# Patient Record
Sex: FEMALE | Race: OTHER | HISPANIC OR LATINO | URBAN - METROPOLITAN AREA
[De-identification: names, ages, dates, MRNs, and addresses within clinical notes are randomized per-mention and may not be internally consistent; named-entity substitution may affect disease eponyms.]

---

## 2019-06-30 ENCOUNTER — EMERGENCY (EMERGENCY)
Facility: HOSPITAL | Age: 19
LOS: 1 days | Discharge: HOME | End: 2019-06-30
Attending: EMERGENCY MEDICINE
Payer: SELF-PAY

## 2019-06-30 VITALS
SYSTOLIC BLOOD PRESSURE: 106 MMHG | OXYGEN SATURATION: 98 % | HEIGHT: 62 IN | DIASTOLIC BLOOD PRESSURE: 69 MMHG | TEMPERATURE: 99 F | WEIGHT: 130.07 LBS | RESPIRATION RATE: 16 BRPM | HEART RATE: 108 BPM

## 2019-06-30 VITALS — HEART RATE: 92 BPM

## 2019-06-30 LAB
APPEARANCE UR: CLEAR — SIGNIFICANT CHANGE UP
BILIRUB UR-MCNC: NEGATIVE — SIGNIFICANT CHANGE UP
COLOR SPEC: YELLOW — SIGNIFICANT CHANGE UP
DIFF PNL FLD: ABNORMAL
GLUCOSE UR QL: NEGATIVE MG/DL — SIGNIFICANT CHANGE UP
HCG UR QL: NEGATIVE — SIGNIFICANT CHANGE UP
KETONES UR-MCNC: NEGATIVE — SIGNIFICANT CHANGE UP
LEUKOCYTE ESTERASE UR-ACNC: ABNORMAL
NITRITE UR-MCNC: NEGATIVE — SIGNIFICANT CHANGE UP
PH UR: 6.5 — SIGNIFICANT CHANGE UP (ref 5–8)
PROT UR-MCNC: NEGATIVE MG/DL — SIGNIFICANT CHANGE UP
SP GR SPEC: 1 — LOW (ref 1.01–1.02)
UROBILINOGEN FLD QL: NEGATIVE MG/DL — SIGNIFICANT CHANGE UP

## 2019-06-30 PROCEDURE — 81001 URINALYSIS AUTO W/SCOPE: CPT

## 2019-06-30 PROCEDURE — 87591 N.GONORRHOEAE DNA AMP PROB: CPT

## 2019-06-30 PROCEDURE — 81025 URINE PREGNANCY TEST: CPT

## 2019-06-30 PROCEDURE — 99283 EMERGENCY DEPT VISIT LOW MDM: CPT

## 2019-06-30 PROCEDURE — 87491 CHLMYD TRACH DNA AMP PROBE: CPT

## 2019-06-30 PROCEDURE — T1013: CPT

## 2019-06-30 RX ORDER — PHENAZOPYRIDINE HCL 100 MG
1 TABLET ORAL
Qty: 5 | Refills: 0
Start: 2019-06-30 | End: 2019-07-01

## 2019-06-30 RX ORDER — CEPHALEXIN 500 MG
500 CAPSULE ORAL ONCE
Refills: 0 | Status: COMPLETED | OUTPATIENT
Start: 2019-06-30 | End: 2019-06-30

## 2019-06-30 RX ORDER — CEPHALEXIN 500 MG
1 CAPSULE ORAL
Qty: 14 | Refills: 0
Start: 2019-06-30 | End: 2019-07-06

## 2019-06-30 RX ORDER — PHENAZOPYRIDINE HCL 100 MG
200 TABLET ORAL ONCE
Refills: 0 | Status: COMPLETED | OUTPATIENT
Start: 2019-06-30 | End: 2019-06-30

## 2019-06-30 RX ADMIN — Medication 200 MILLIGRAM(S): at 13:38

## 2019-06-30 RX ADMIN — Medication 500 MILLIGRAM(S): at 13:38

## 2019-06-30 NOTE — ED PROVIDER NOTE - ATTENDING CONTRIBUTION TO CARE
18 year old c/o dysuria x 1 day with no fever, hematuria, or vaginal discharge.  Patient well appearing.  UA positive for UTI.  GC/chlamydia urine sample sent.  Patient treated for UTI and given pyridium.

## 2019-06-30 NOTE — ED PROVIDER NOTE - CLINICAL SUMMARY MEDICAL DECISION MAKING FREE TEXT BOX
18f with burning urination x 1 day. Mod leuks and 11-25 wbc. No flank pain. Will treat cystitis with Keflex / pyridium. Pt educated to return to ED if she develops fevers, vomiting, worsening pain.

## 2019-06-30 NOTE — ED PROVIDER NOTE - OBJECTIVE STATEMENT
Pt is a 17 y/o f, NO PMH, presents to ED c/o burning urination x 1 day. Pt states the pain started yesterday and has persisted all day. Pt states she has not had these symptoms in the past. Pt has had no fevers since onset of symptoms. Pts LMP ended this Wednesday. Pt states she had no recent sexual partners. Pt denies abdominal pain, vaginal discharge, vaginal bleeding, HA, dizziness, nausea.

## 2019-06-30 NOTE — ED ADULT NURSE NOTE - OBJECTIVE STATEMENT
pt awake, alert and oriented x3 c/o painful urination.  Respirations even and unlabored, denies chest pain.  Abdomen soft, nontender, nondistended.  Skin warm and dry.  Moving all extremities well and with purpose.

## 2019-07-27 ENCOUNTER — EMERGENCY (EMERGENCY)
Facility: HOSPITAL | Age: 19
LOS: 1 days | Discharge: HOME | End: 2019-07-27
Attending: EMERGENCY MEDICINE
Payer: MEDICAID

## 2019-07-27 VITALS
DIASTOLIC BLOOD PRESSURE: 69 MMHG | OXYGEN SATURATION: 98 % | WEIGHT: 110.01 LBS | TEMPERATURE: 99 F | HEART RATE: 98 BPM | HEIGHT: 60 IN | RESPIRATION RATE: 18 BRPM | SYSTOLIC BLOOD PRESSURE: 103 MMHG

## 2019-07-27 LAB
APPEARANCE UR: CLEAR — SIGNIFICANT CHANGE UP
BILIRUB UR-MCNC: NEGATIVE — SIGNIFICANT CHANGE UP
COLOR SPEC: YELLOW — SIGNIFICANT CHANGE UP
DIFF PNL FLD: ABNORMAL
GLUCOSE UR QL: NEGATIVE MG/DL — SIGNIFICANT CHANGE UP
HCG UR QL: NEGATIVE — SIGNIFICANT CHANGE UP
KETONES UR-MCNC: NEGATIVE — SIGNIFICANT CHANGE UP
LEUKOCYTE ESTERASE UR-ACNC: ABNORMAL
NITRITE UR-MCNC: POSITIVE
PH UR: 6.5 — SIGNIFICANT CHANGE UP (ref 5–8)
PROT UR-MCNC: 15 MG/DL
SP GR SPEC: 1.01 — SIGNIFICANT CHANGE UP (ref 1.01–1.02)
UROBILINOGEN FLD QL: NEGATIVE MG/DL — SIGNIFICANT CHANGE UP

## 2019-07-27 PROCEDURE — T1013: CPT

## 2019-07-27 PROCEDURE — 87491 CHLMYD TRACH DNA AMP PROBE: CPT

## 2019-07-27 PROCEDURE — 87591 N.GONORRHOEAE DNA AMP PROB: CPT

## 2019-07-27 PROCEDURE — 87186 SC STD MICRODIL/AGAR DIL: CPT

## 2019-07-27 PROCEDURE — 81001 URINALYSIS AUTO W/SCOPE: CPT

## 2019-07-27 PROCEDURE — 99283 EMERGENCY DEPT VISIT LOW MDM: CPT

## 2019-07-27 PROCEDURE — 81025 URINE PREGNANCY TEST: CPT

## 2019-07-27 PROCEDURE — 87086 URINE CULTURE/COLONY COUNT: CPT

## 2019-07-27 PROCEDURE — 87070 CULTURE OTHR SPECIMN AEROBIC: CPT

## 2019-07-27 RX ORDER — NITROFURANTOIN MACROCRYSTAL 50 MG
1 CAPSULE ORAL
Qty: 10 | Refills: 0
Start: 2019-07-27 | End: 2019-07-31

## 2019-07-27 RX ORDER — NITROFURANTOIN MACROCRYSTAL 50 MG
100 CAPSULE ORAL ONCE
Refills: 0 | Status: COMPLETED | OUTPATIENT
Start: 2019-07-27 | End: 2019-07-27

## 2019-07-27 RX ADMIN — Medication 100 MILLIGRAM(S): at 21:26

## 2019-07-27 NOTE — ED STATDOCS - OBJECTIVE STATEMENT
19 y/o F pt with no significant PMHx presents to the ED c/o dysuria, onset today. Patient states that these symptoms recur after every mensis.  She reports that she is monogamous with 1 partner. Denies vaginal discharge, fever, or PMHx of STDs. No further acute complaints at this time. 17 y/o F pt with no significant PMHx presents to the ED c/o dysuria, onset today. Patient states that these symptoms recur after every menses.  She reports that she is monogamous with 1 partner. Denies vaginal discharge, fever, or PMHx of STDs. No further acute complaints at this time.

## 2019-07-27 NOTE — ED STATDOCS - CLINICAL SUMMARY MEDICAL DECISION MAKING FREE TEXT BOX
Pt with recurrent dysuria. Will check UA to rule out UTI, pelvic exam to obtain cervical swab to check for chlamydia and gonorrhea.

## 2019-07-27 NOTE — ED STATDOCS - PROGRESS NOTE DETAILS
LEONIDAS Tejeda NOTE: Pt evaluated at bedside. Pt evaluated prior by intake physician. Otherwise HPI/PE/ROS as noted above. Will follow up plan per intake physician.  + UTI, will tx with macrobid, discussed w/ pt and discussed return precautions. Pt tolerating PO. LEONIDAS Tejeda NOTE: Pt evaluated at bedside. Pt evaluated prior by intake physician. Pelvic completed by MD Moss. Otherwise HPI/PE/ROS as noted above. Will follow up plan per intake physician.  + UTI, will tx with macrobid, discussed w/ pt and discussed return precautions. Pt tolerating PO. ED  Faustina.

## 2019-07-29 LAB
C TRACH RRNA SPEC QL NAA+PROBE: SIGNIFICANT CHANGE UP
N GONORRHOEA RRNA SPEC QL NAA+PROBE: SIGNIFICANT CHANGE UP
SPECIMEN SOURCE: SIGNIFICANT CHANGE UP

## 2019-07-30 LAB
-  AMIKACIN: SIGNIFICANT CHANGE UP
-  AMPICILLIN/SULBACTAM: SIGNIFICANT CHANGE UP
-  AMPICILLIN: SIGNIFICANT CHANGE UP
-  AZTREONAM: SIGNIFICANT CHANGE UP
-  CEFAZOLIN: SIGNIFICANT CHANGE UP
-  CEFEPIME: SIGNIFICANT CHANGE UP
-  CEFOXITIN: SIGNIFICANT CHANGE UP
-  CEFTRIAXONE: SIGNIFICANT CHANGE UP
-  CIPROFLOXACIN: SIGNIFICANT CHANGE UP
-  ERTAPENEM: SIGNIFICANT CHANGE UP
-  GENTAMICIN: SIGNIFICANT CHANGE UP
-  IMIPENEM: SIGNIFICANT CHANGE UP
-  LEVOFLOXACIN: SIGNIFICANT CHANGE UP
-  MEROPENEM: SIGNIFICANT CHANGE UP
-  NITROFURANTOIN: SIGNIFICANT CHANGE UP
-  PIPERACILLIN/TAZOBACTAM: SIGNIFICANT CHANGE UP
-  TIGECYCLINE: SIGNIFICANT CHANGE UP
-  TOBRAMYCIN: SIGNIFICANT CHANGE UP
-  TRIMETHOPRIM/SULFAMETHOXAZOLE: SIGNIFICANT CHANGE UP
CULTURE RESULTS: SIGNIFICANT CHANGE UP
CULTURE RESULTS: SIGNIFICANT CHANGE UP
METHOD TYPE: SIGNIFICANT CHANGE UP
ORGANISM # SPEC MICROSCOPIC CNT: SIGNIFICANT CHANGE UP
ORGANISM # SPEC MICROSCOPIC CNT: SIGNIFICANT CHANGE UP
SPECIMEN SOURCE: SIGNIFICANT CHANGE UP
SPECIMEN SOURCE: SIGNIFICANT CHANGE UP

## 2019-07-31 NOTE — ED POST DISCHARGE NOTE - RESULT SUMMARY
UC 49,000, patient symptomatic as per chart, on macrobid is sensitive, patient feeling better, will f/u with PCP to have rpt UC after completion of abx

## 2019-08-17 ENCOUNTER — INPATIENT (INPATIENT)
Facility: HOSPITAL | Age: 19
LOS: 1 days | Discharge: HOME | DRG: 872 | End: 2019-08-19
Attending: HOSPITALIST | Admitting: HOSPITALIST
Payer: MEDICAID

## 2019-08-17 VITALS
SYSTOLIC BLOOD PRESSURE: 106 MMHG | WEIGHT: 145.95 LBS | TEMPERATURE: 103 F | RESPIRATION RATE: 22 BRPM | HEART RATE: 139 BPM | OXYGEN SATURATION: 99 % | DIASTOLIC BLOOD PRESSURE: 71 MMHG | HEIGHT: 61 IN

## 2019-08-17 DIAGNOSIS — N39.0 URINARY TRACT INFECTION, SITE NOT SPECIFIED: ICD-10-CM

## 2019-08-17 PROBLEM — Z78.9 OTHER SPECIFIED HEALTH STATUS: Chronic | Status: ACTIVE | Noted: 2019-08-08

## 2019-08-17 LAB
ALBUMIN SERPL ELPH-MCNC: 4.6 G/DL — SIGNIFICANT CHANGE UP (ref 3.3–5.2)
ALP SERPL-CCNC: 93 U/L — SIGNIFICANT CHANGE UP (ref 40–120)
ALT FLD-CCNC: 27 U/L — SIGNIFICANT CHANGE UP
ANION GAP SERPL CALC-SCNC: 13 MMOL/L — SIGNIFICANT CHANGE UP (ref 5–17)
APPEARANCE UR: CLEAR — SIGNIFICANT CHANGE UP
APTT BLD: 31.4 SEC — SIGNIFICANT CHANGE UP (ref 27.5–36.3)
AST SERPL-CCNC: 25 U/L — SIGNIFICANT CHANGE UP
BACTERIA # UR AUTO: ABNORMAL
BASOPHILS # BLD AUTO: 0.03 K/UL — SIGNIFICANT CHANGE UP (ref 0–0.2)
BASOPHILS NFR BLD AUTO: 0.2 % — SIGNIFICANT CHANGE UP (ref 0–2)
BILIRUB SERPL-MCNC: 0.8 MG/DL — SIGNIFICANT CHANGE UP (ref 0.4–2)
BILIRUB UR-MCNC: NEGATIVE — SIGNIFICANT CHANGE UP
BUN SERPL-MCNC: 8 MG/DL — SIGNIFICANT CHANGE UP (ref 8–20)
CALCIUM SERPL-MCNC: 9.8 MG/DL — SIGNIFICANT CHANGE UP (ref 8.6–10.2)
CHLORIDE SERPL-SCNC: 98 MMOL/L — SIGNIFICANT CHANGE UP (ref 98–107)
CO2 SERPL-SCNC: 21 MMOL/L — LOW (ref 22–29)
COLOR SPEC: YELLOW — SIGNIFICANT CHANGE UP
CREAT SERPL-MCNC: 0.46 MG/DL — LOW (ref 0.5–1.3)
DIFF PNL FLD: ABNORMAL
EOSINOPHIL # BLD AUTO: 0.04 K/UL — SIGNIFICANT CHANGE UP (ref 0–0.5)
EOSINOPHIL NFR BLD AUTO: 0.3 % — SIGNIFICANT CHANGE UP (ref 0–6)
EPI CELLS # UR: SIGNIFICANT CHANGE UP
GLUCOSE SERPL-MCNC: 119 MG/DL — HIGH (ref 70–115)
GLUCOSE UR QL: NEGATIVE MG/DL — SIGNIFICANT CHANGE UP
HCG SERPL-ACNC: 11.7 MIU/ML — HIGH
HCT VFR BLD CALC: 37.2 % — SIGNIFICANT CHANGE UP (ref 34.5–45)
HGB BLD-MCNC: 12.3 G/DL — SIGNIFICANT CHANGE UP (ref 11.5–15.5)
IMM GRANULOCYTES NFR BLD AUTO: 0.9 % — SIGNIFICANT CHANGE UP (ref 0–1.5)
INR BLD: 1.56 RATIO — HIGH (ref 0.88–1.16)
KETONES UR-MCNC: NEGATIVE — SIGNIFICANT CHANGE UP
LACTATE BLDV-MCNC: 1 MMOL/L — SIGNIFICANT CHANGE UP (ref 0.5–2)
LEUKOCYTE ESTERASE UR-ACNC: ABNORMAL
LYMPHOCYTES # BLD AUTO: 1.35 K/UL — SIGNIFICANT CHANGE UP (ref 1–3.3)
LYMPHOCYTES # BLD AUTO: 10.9 % — LOW (ref 13–44)
MCHC RBC-ENTMCNC: 30.8 PG — SIGNIFICANT CHANGE UP (ref 27–34)
MCHC RBC-ENTMCNC: 33.1 GM/DL — SIGNIFICANT CHANGE UP (ref 32–36)
MCV RBC AUTO: 93.2 FL — SIGNIFICANT CHANGE UP (ref 80–100)
MONOCYTES # BLD AUTO: 1.3 K/UL — HIGH (ref 0–0.9)
MONOCYTES NFR BLD AUTO: 10.5 % — SIGNIFICANT CHANGE UP (ref 2–14)
NEUTROPHILS # BLD AUTO: 9.57 K/UL — HIGH (ref 1.8–7.4)
NEUTROPHILS NFR BLD AUTO: 77.2 % — HIGH (ref 43–77)
NITRITE UR-MCNC: POSITIVE
PH UR: 6.5 — SIGNIFICANT CHANGE UP (ref 5–8)
PLATELET # BLD AUTO: 276 K/UL — SIGNIFICANT CHANGE UP (ref 150–400)
POTASSIUM SERPL-MCNC: 3.7 MMOL/L — SIGNIFICANT CHANGE UP (ref 3.5–5.3)
POTASSIUM SERPL-SCNC: 3.7 MMOL/L — SIGNIFICANT CHANGE UP (ref 3.5–5.3)
PROT SERPL-MCNC: 7.8 G/DL — SIGNIFICANT CHANGE UP (ref 6.6–8.7)
PROT UR-MCNC: 15 MG/DL
PROTHROM AB SERPL-ACNC: 18.2 SEC — HIGH (ref 10–12.9)
RBC # BLD: 3.99 M/UL — SIGNIFICANT CHANGE UP (ref 3.8–5.2)
RBC # FLD: 12 % — SIGNIFICANT CHANGE UP (ref 10.3–14.5)
RBC CASTS # UR COMP ASSIST: SIGNIFICANT CHANGE UP /HPF (ref 0–4)
SODIUM SERPL-SCNC: 132 MMOL/L — LOW (ref 135–145)
SP GR SPEC: 1 — LOW (ref 1.01–1.02)
UROBILINOGEN FLD QL: NEGATIVE MG/DL — SIGNIFICANT CHANGE UP
WBC # BLD: 12.4 K/UL — HIGH (ref 3.8–10.5)
WBC # FLD AUTO: 12.4 K/UL — HIGH (ref 3.8–10.5)
WBC UR QL: SIGNIFICANT CHANGE UP

## 2019-08-17 PROCEDURE — 71045 X-RAY EXAM CHEST 1 VIEW: CPT | Mod: 26

## 2019-08-17 PROCEDURE — 99223 1ST HOSP IP/OBS HIGH 75: CPT

## 2019-08-17 PROCEDURE — 99291 CRITICAL CARE FIRST HOUR: CPT

## 2019-08-17 RX ORDER — IBUPROFEN 200 MG
1 TABLET ORAL
Qty: 15 | Refills: 0
Start: 2019-08-17 | End: 2019-08-21

## 2019-08-17 RX ORDER — SACCHAROMYCES BOULARDII 250 MG
250 POWDER IN PACKET (EA) ORAL
Refills: 0 | Status: DISCONTINUED | OUTPATIENT
Start: 2019-08-17 | End: 2019-08-19

## 2019-08-17 RX ORDER — CEPHALEXIN 500 MG
1 CAPSULE ORAL
Qty: 30 | Refills: 0
Start: 2019-08-17 | End: 2019-08-26

## 2019-08-17 RX ORDER — ACETAMINOPHEN 500 MG
650 TABLET ORAL EVERY 6 HOURS
Refills: 0 | Status: DISCONTINUED | OUTPATIENT
Start: 2019-08-17 | End: 2019-08-19

## 2019-08-17 RX ORDER — CEFTRIAXONE 500 MG/1
1 INJECTION, POWDER, FOR SOLUTION INTRAMUSCULAR; INTRAVENOUS ONCE
Refills: 0 | Status: COMPLETED | OUTPATIENT
Start: 2019-08-17 | End: 2019-08-17

## 2019-08-17 RX ORDER — SODIUM CHLORIDE 9 MG/ML
2500 INJECTION INTRAMUSCULAR; INTRAVENOUS; SUBCUTANEOUS ONCE
Refills: 0 | Status: COMPLETED | OUTPATIENT
Start: 2019-08-17 | End: 2019-08-17

## 2019-08-17 RX ORDER — CEFTRIAXONE 500 MG/1
1000 INJECTION, POWDER, FOR SOLUTION INTRAMUSCULAR; INTRAVENOUS EVERY 24 HOURS
Refills: 0 | Status: DISCONTINUED | OUTPATIENT
Start: 2019-08-18 | End: 2019-08-19

## 2019-08-17 RX ORDER — SODIUM CHLORIDE 9 MG/ML
1000 INJECTION INTRAMUSCULAR; INTRAVENOUS; SUBCUTANEOUS
Refills: 0 | Status: DISCONTINUED | OUTPATIENT
Start: 2019-08-17 | End: 2019-08-19

## 2019-08-17 RX ORDER — IBUPROFEN 200 MG
600 TABLET ORAL ONCE
Refills: 0 | Status: COMPLETED | OUTPATIENT
Start: 2019-08-17 | End: 2019-08-17

## 2019-08-17 RX ORDER — ACETAMINOPHEN 500 MG
975 TABLET ORAL ONCE
Refills: 0 | Status: COMPLETED | OUTPATIENT
Start: 2019-08-17 | End: 2019-08-17

## 2019-08-17 RX ORDER — SODIUM CHLORIDE 9 MG/ML
2500 INJECTION, SOLUTION INTRAVENOUS ONCE
Refills: 0 | Status: COMPLETED | OUTPATIENT
Start: 2019-08-17 | End: 2019-08-17

## 2019-08-17 RX ADMIN — Medication 975 MILLIGRAM(S): at 06:55

## 2019-08-17 RX ADMIN — Medication 975 MILLIGRAM(S): at 07:00

## 2019-08-17 RX ADMIN — SODIUM CHLORIDE 2500 MILLILITER(S): 9 INJECTION INTRAMUSCULAR; INTRAVENOUS; SUBCUTANEOUS at 13:00

## 2019-08-17 RX ADMIN — Medication 250 MILLIGRAM(S): at 17:46

## 2019-08-17 RX ADMIN — SODIUM CHLORIDE 2500 MILLILITER(S): 9 INJECTION, SOLUTION INTRAVENOUS at 06:55

## 2019-08-17 RX ADMIN — SODIUM CHLORIDE 125 MILLILITER(S): 9 INJECTION INTRAMUSCULAR; INTRAVENOUS; SUBCUTANEOUS at 20:23

## 2019-08-17 RX ADMIN — Medication 650 MILLIGRAM(S): at 20:22

## 2019-08-17 RX ADMIN — SODIUM CHLORIDE 2500 MILLILITER(S): 9 INJECTION, SOLUTION INTRAVENOUS at 08:16

## 2019-08-17 RX ADMIN — SODIUM CHLORIDE 2500 MILLILITER(S): 9 INJECTION INTRAMUSCULAR; INTRAVENOUS; SUBCUTANEOUS at 10:17

## 2019-08-17 RX ADMIN — CEFTRIAXONE 1 MILLIGRAM(S): 500 INJECTION, POWDER, FOR SOLUTION INTRAMUSCULAR; INTRAVENOUS at 09:16

## 2019-08-17 RX ADMIN — Medication 600 MILLIGRAM(S): at 11:42

## 2019-08-17 RX ADMIN — CEFTRIAXONE 100 MILLIGRAM(S): 500 INJECTION, POWDER, FOR SOLUTION INTRAMUSCULAR; INTRAVENOUS at 06:55

## 2019-08-17 NOTE — H&P ADULT - NSHPPHYSICALEXAM_GEN_ALL_CORE
Vital Signs Last 24 Hrs  T(C): 37.9 (17 Aug 2019 11:33), Max: 39.5 (17 Aug 2019 06:23)  T(F): 100.3 (17 Aug 2019 11:33), Max: 103.1 (17 Aug 2019 06:23)  HR: 138 (17 Aug 2019 11:33) (114 - 139)  BP: 111/62 (17 Aug 2019 11:33) (105/58 - 117/71)  BP(mean): 82 (17 Aug 2019 06:43) (82 - 82)  RR: 16 (17 Aug 2019 11:33) (16 - 22)  SpO2: 99% (17 Aug 2019 11:33) (99% - 99%)    General appearance: No acute distress, Awake, Alert  HEENT: Normocephalic, Atraumatic, Conjunctiva clear, EOMI  Neck: Supple, No JVD, No tenderness  Lungs: Breath sound equal bilaterally, No wheezes, No rales  Cardiovascular: S1S2, Regular rhythm  Abdomen: Soft, Nontender, Nondistended, No guarding/rebound, Positive bowel sounds, No costovertebral angle tenderness  Extremities: No clubbing, No cyanosis, No edema, No calf tenderness  Neuro: Strength equal bilaterally, No tremors  Psychiatric: Appropriate mood, Normal affect

## 2019-08-17 NOTE — ED PROVIDER NOTE - CLINICAL SUMMARY MEDICAL DECISION MAKING FREE TEXT BOX
17 y/o female with hx of uti presents to the ED c/o painful urination x 2 days with associated fever. burning urination, likely uti, will order xray for nonproductive cough, sepsis labs, reassess

## 2019-08-17 NOTE — ED PROVIDER NOTE - ATTENDING CONTRIBUTION TO CARE
I, Tomer Hernandez, performed a face to face bedside interview with this patient regarding history of present illness, review of symptoms and relevant past medical, social and family history.  I completed an independent physical examination. I have communicated the patient’s plan of care and disposition with the ACP.  18 year old female with no pmh presents as a code sepsis with 2 days of painful urination, and then body aches and fever.  Gen: NAD, well appearing  CV: tachycardic  Pul: CTA b/l  Abd: Soft, non-distended, non-tender  Neuro: no focal deficits  Pt with utia and sepsis, admitted

## 2019-08-17 NOTE — SEPSIS NOTE - ASSESSMENT
assumed care of pt as code team called to code sepsis in critical care room. LEONIDAS davis at bedside. pt presents in no apparent distress at this time. tachycardic on cardiac monitor, maintaining airway on room air. pt has no complaints at this time.

## 2019-08-17 NOTE — ED ADULT NURSE REASSESSMENT NOTE - NS ED NURSE REASSESS COMMENT FT1
assumed patient care. patient aox3 comfortable in appearance patient denies pain states she is starting to feel better. denies chills denies n/v/d ambulated to the bathroom.

## 2019-08-17 NOTE — H&P ADULT - HISTORY OF PRESENT ILLNESS
Information obtained with the assistance of the  at the bedside.    18F presented with a two day history of dysuria, urinary frequency, and fevers. The patient reported developing the symptoms two days prior and had been taking an over the counter medication without improvement. She also noted chills at home associated with the fevers but denied any hematuria. She reports similar symptoms in the past about one month prior which was treated with oral antibiotics. She is unaware of any aggravating or relieving factors. She reports that she is currently not sexually active. She reported that she rarely has urine infections. After the prior treatment with oral antibiotics, the patient had not been on any medications at home. She denied any back pain, nausea, or vomiting. In the emergency department, the patient was noted to have persistent tachycardia and rigors. She denied any chest pain, palpitations, or dyspnea.

## 2019-08-17 NOTE — ED PROVIDER NOTE - OBJECTIVE STATEMENT
19 y/o female with hx of uti presents to the ED c/o painful urination x 2 days with associated fever. Pt notes painful urination began yesterday, pt was taking otc medication (Espamofin?) with little relief of the burning urination, subjective fever. states "this happened in the past and was treated with outpt abx in july". Pt states not sexually active, last sexual activity 2 months ago, no hx of std. States menstrual cycle expecting to be in 2 days. Denies chances of being pregnant. Also notes nonproductive cough. No hx of abdominal surgery. Denies chest pain, shortness of breath, sore throat, ear pain, pain in the back, urinary frequency, n/v, vaginal bleeding, vaginal discharge.

## 2019-08-17 NOTE — ED PROVIDER NOTE - CARE PLAN
Principal Discharge DX:	UTI (urinary tract infection) Principal Discharge DX:	UTI (urinary tract infection)  Secondary Diagnosis:	Sepsis

## 2019-08-17 NOTE — H&P ADULT - NSHPSOCIALHISTORY_GEN_ALL_CORE
Denied tobacco, alcohol, or illicit drug use  Lives at home with her mother    Family History: Mother without urogenital disease

## 2019-08-17 NOTE — ED PROVIDER NOTE - PROGRESS NOTE DETAILS
The patient appears well and feels better and will dc home on keflex and motrin and follow up with PMD The patient  and rigor and will admit for further evaluation

## 2019-08-17 NOTE — SEPSIS NOTE - NSSUBJFT_GEN_A_CORE
pt reports having burning urination x2 days with subjective fever at home. no other associated symptoms. denies blood in urine.

## 2019-08-17 NOTE — ED ADULT NURSE REASSESSMENT NOTE - NS ED NURSE REASSESS COMMENT FT1
patient tachycardic 138 , temp 100.3 and chills. since pt was sepsis earlier MD notified and continue ivf and treat fever

## 2019-08-17 NOTE — ED PROVIDER NOTE - CHPI ED SYMPTOMS NEG
Writer spoke with Dr. Edmond about patient's having focal seizures in left arm, occasionally his right leg, with body tremors. Left arm continually tremors even after ativan has been given, Dr. Caldwell aware. Dr. Mock would like Dr. Kumar with neurology consulted. Will follow through on order and continue to monitor.    no vomiting/no diarrhea/no hematuria/no nausea/no blood in stool/no abdominal distension/no chills

## 2019-08-17 NOTE — H&P ADULT - ASSESSMENT
18F with urinary tract infection, fevers, chills, tachycardia, and leukocytosis    Sepsis / Urinary tract infection - Empiric intravenous antibiotics initiated. Intravenous fluid hydration Culture results to be followed. Repeat CBC to monitor leukocytosis. Physical examination without flank pain or costovertebral angle tenderness. Prior urine culture from July noted ecoli.    Hyponatremia - Mild. On intravenous fluids. Repeat laboratory studies ordered to monitor.    Tachycardia - Sinus rhythm on examination. Intravenous fluid hydration and acetaminophen for fever. TSH pending. Blood pressure stable and the patient was asymptomatic.    Discussed with the patient and her mother at the bedside.

## 2019-08-17 NOTE — ED ADULT TRIAGE NOTE - CHIEF COMPLAINT QUOTE
patient states fever and body aches for 2 days, took meds but came back, patient also has pain with urination, code sepsis called

## 2019-08-18 LAB
ANION GAP SERPL CALC-SCNC: 14 MMOL/L — SIGNIFICANT CHANGE UP (ref 5–17)
BUN SERPL-MCNC: 4 MG/DL — LOW (ref 8–20)
CALCIUM SERPL-MCNC: 8.7 MG/DL — SIGNIFICANT CHANGE UP (ref 8.6–10.2)
CHLORIDE SERPL-SCNC: 100 MMOL/L — SIGNIFICANT CHANGE UP (ref 98–107)
CO2 SERPL-SCNC: 19 MMOL/L — LOW (ref 22–29)
CREAT SERPL-MCNC: 0.39 MG/DL — LOW (ref 0.5–1.3)
GLUCOSE SERPL-MCNC: 94 MG/DL — SIGNIFICANT CHANGE UP (ref 70–115)
HCT VFR BLD CALC: 34.9 % — SIGNIFICANT CHANGE UP (ref 34.5–45)
HGB BLD-MCNC: 11.4 G/DL — LOW (ref 11.5–15.5)
MCHC RBC-ENTMCNC: 31.2 PG — SIGNIFICANT CHANGE UP (ref 27–34)
MCHC RBC-ENTMCNC: 32.7 GM/DL — SIGNIFICANT CHANGE UP (ref 32–36)
MCV RBC AUTO: 95.6 FL — SIGNIFICANT CHANGE UP (ref 80–100)
PLATELET # BLD AUTO: 225 K/UL — SIGNIFICANT CHANGE UP (ref 150–400)
POTASSIUM SERPL-MCNC: 3.3 MMOL/L — LOW (ref 3.5–5.3)
POTASSIUM SERPL-SCNC: 3.3 MMOL/L — LOW (ref 3.5–5.3)
RBC # BLD: 3.65 M/UL — LOW (ref 3.8–5.2)
RBC # FLD: 11.9 % — SIGNIFICANT CHANGE UP (ref 10.3–14.5)
SODIUM SERPL-SCNC: 133 MMOL/L — LOW (ref 135–145)
TSH SERPL-MCNC: 4.43 UIU/ML — HIGH (ref 0.27–4.2)
WBC # BLD: 11.81 K/UL — HIGH (ref 3.8–10.5)
WBC # FLD AUTO: 11.81 K/UL — HIGH (ref 3.8–10.5)

## 2019-08-18 PROCEDURE — 99232 SBSQ HOSP IP/OBS MODERATE 35: CPT

## 2019-08-18 RX ORDER — POTASSIUM CHLORIDE 20 MEQ
20 PACKET (EA) ORAL
Refills: 0 | Status: COMPLETED | OUTPATIENT
Start: 2019-08-18 | End: 2019-08-18

## 2019-08-18 RX ADMIN — Medication 20 MILLIEQUIVALENT(S): at 11:00

## 2019-08-18 RX ADMIN — Medication 250 MILLIGRAM(S): at 18:32

## 2019-08-18 RX ADMIN — Medication 250 MILLIGRAM(S): at 11:00

## 2019-08-18 RX ADMIN — Medication 20 MILLIEQUIVALENT(S): at 15:51

## 2019-08-18 RX ADMIN — Medication 650 MILLIGRAM(S): at 07:43

## 2019-08-18 RX ADMIN — CEFTRIAXONE 100 MILLIGRAM(S): 500 INJECTION, POWDER, FOR SOLUTION INTRAMUSCULAR; INTRAVENOUS at 05:05

## 2019-08-18 RX ADMIN — Medication 20 MILLIEQUIVALENT(S): at 13:46

## 2019-08-18 RX ADMIN — Medication 650 MILLIGRAM(S): at 09:42

## 2019-08-18 RX ADMIN — SODIUM CHLORIDE 125 MILLILITER(S): 9 INJECTION INTRAMUSCULAR; INTRAVENOUS; SUBCUTANEOUS at 07:43

## 2019-08-18 NOTE — PROGRESS NOTE PEDS - SUBJECTIVE AND OBJECTIVE BOX
SONIA DAVILA  ----------------------------------------  The patient was seen and evaluated for sepsis.  The patient is in no acute distress.  Denied any chest pain, palpitations, dyspnea, or abdominal pain.  Reports feeling better. No dysuria. Tolerating oral intake.    Vital Signs Last 24 Hrs  T(C): 38.6 (18 Aug 2019 07:41), Max: 38.6 (18 Aug 2019 07:41)  T(F): 101.4 (18 Aug 2019 07:41), Max: 101.4 (18 Aug 2019 07:41)  HR: 118 (18 Aug 2019 07:41) (96 - 138)  BP: 96/62 (18 Aug 2019 07:41) (86/52 - 114/78)  BP(mean): --  RR: 16 (18 Aug 2019 07:41) (16 - 24)  SpO2: 98% (18 Aug 2019 07:41) (95% - 99%)    PHYSICAL EXAMINATION:  ----------------------------------------  General appearance: No acute distress, Awake, Alert  HEENT: Normocephalic, Atraumatic, Conjunctiva clear, EOMI  Neck: Supple, No JVD, No tenderness  Lungs: Breath sound equal bilaterally, No wheezes, No rales  Cardiovascular: S1S2, Regular rhythm  Abdomen: Soft, Nontender, Nondistended, No guarding/rebound, Positive bowel sounds, No costovertebral angle tenderness  Extremities: No clubbing, No cyanosis, No edema, No calf tenderness  Neuro: Strength equal bilaterally, No tremors  Psychiatric: Appropriate mood, Normal affect    LABORATORY STUDIES:  ----------------------------------------             11.4   11.81 )-----------( 225      ( 18 Aug 2019 07:43 )             34.9     08-18    133<L>  |  100  |  4.0<L>  ----------------------------<  94  3.3<L>   |  19.0<L>  |  0.39<L>    Ca    8.7      18 Aug 2019 07:43    TPro  7.8  /  Alb  4.6  /  TBili  0.8  /  DBili  x   /  AST  25  /  ALT  27  /  AlkPhos  93  08-17    LIVER FUNCTIONS - ( 17 Aug 2019 06:41 )  Alb: 4.6 g/dL / Pro: 7.8 g/dL / ALK PHOS: 93 U/L / ALT: 27 U/L / AST: 25 U/L / GGT: x           PT/INR - ( 17 Aug 2019 06:41 )   PT: 18.2 sec;   INR: 1.56 ratio    PTT - ( 17 Aug 2019 06:41 )  PTT:31.4 sec    Urinalysis Basic - ( 17 Aug 2019 08:42 )  Color: Yellow / Appearance: Clear / S.005 / pH: x  Gluc: x / Ketone: Negative  / Bili: Negative / Urobili: Negative mg/dL   Blood: x / Protein: 15 mg/dL / Nitrite: Positive   Leuk Esterase: Moderate / RBC: 0-2 /HPF / WBC 3-5   Sq Epi: x / Non Sq Epi: Few / Bacteria: Few    MEDICATIONS  (STANDING):  cefTRIAXone   IVPB 1000 milliGRAM(s) IV Intermittent every 24 hours  potassium chloride    Tablet ER 20 milliEquivalent(s) Oral every 2 hours  saccharomyces boulardii 250 milliGRAM(s) Oral two times a day  sodium chloride 0.9%. 1000 milliLiter(s) (125 mL/Hr) IV Continuous <Continuous>    MEDICATIONS  (PRN):  acetaminophen   Tablet .. 650 milliGRAM(s) Oral every 6 hours PRN Temp greater or equal to 38C (100.4F), Mild Pain (1 - 3)      ASSESSMENT / PLAN:  ----------------------------------------  18F with urinary tract infection, fevers, chills, tachycardia, and leukocytosis    Sepsis / Urinary tract infection - On ceftriaxone, culture results to be followed. Persistent leukocytosis but improved. Recurrent fever noted. Tachycardia improved.    Hyponatremia - Mild. On intravenous fluids. Repeat laboratory studies ordered to monitor.    Hypokalemia - Potassium supplementation.

## 2019-08-19 ENCOUNTER — TRANSCRIPTION ENCOUNTER (OUTPATIENT)
Age: 19
End: 2019-08-19

## 2019-08-19 VITALS
TEMPERATURE: 98 F | HEART RATE: 105 BPM | SYSTOLIC BLOOD PRESSURE: 108 MMHG | RESPIRATION RATE: 18 BRPM | OXYGEN SATURATION: 100 % | DIASTOLIC BLOOD PRESSURE: 74 MMHG

## 2019-08-19 LAB
-  AMIKACIN: SIGNIFICANT CHANGE UP
-  AMPICILLIN/SULBACTAM: SIGNIFICANT CHANGE UP
-  AMPICILLIN: SIGNIFICANT CHANGE UP
-  AZTREONAM: SIGNIFICANT CHANGE UP
-  CEFAZOLIN: SIGNIFICANT CHANGE UP
-  CEFEPIME: SIGNIFICANT CHANGE UP
-  CEFOXITIN: SIGNIFICANT CHANGE UP
-  CEFTRIAXONE: SIGNIFICANT CHANGE UP
-  CIPROFLOXACIN: SIGNIFICANT CHANGE UP
-  ERTAPENEM: SIGNIFICANT CHANGE UP
-  GENTAMICIN: SIGNIFICANT CHANGE UP
-  IMIPENEM: SIGNIFICANT CHANGE UP
-  LEVOFLOXACIN: SIGNIFICANT CHANGE UP
-  MEROPENEM: SIGNIFICANT CHANGE UP
-  NITROFURANTOIN: SIGNIFICANT CHANGE UP
-  PIPERACILLIN/TAZOBACTAM: SIGNIFICANT CHANGE UP
-  TIGECYCLINE: SIGNIFICANT CHANGE UP
-  TOBRAMYCIN: SIGNIFICANT CHANGE UP
-  TRIMETHOPRIM/SULFAMETHOXAZOLE: SIGNIFICANT CHANGE UP
ANION GAP SERPL CALC-SCNC: 11 MMOL/L — SIGNIFICANT CHANGE UP (ref 5–17)
BUN SERPL-MCNC: 4 MG/DL — LOW (ref 8–20)
CALCIUM SERPL-MCNC: 8.9 MG/DL — SIGNIFICANT CHANGE UP (ref 8.6–10.2)
CHLORIDE SERPL-SCNC: 109 MMOL/L — HIGH (ref 98–107)
CO2 SERPL-SCNC: 20 MMOL/L — LOW (ref 22–29)
CREAT SERPL-MCNC: 0.34 MG/DL — LOW (ref 0.5–1.3)
CULTURE RESULTS: SIGNIFICANT CHANGE UP
GLUCOSE SERPL-MCNC: 101 MG/DL — SIGNIFICANT CHANGE UP (ref 70–115)
HCG SERPL-ACNC: 40 MIU/ML — HIGH
HCT VFR BLD CALC: 35.6 % — SIGNIFICANT CHANGE UP (ref 34.5–45)
HGB BLD-MCNC: 11.6 G/DL — SIGNIFICANT CHANGE UP (ref 11.5–15.5)
HIV 1 & 2 AB SERPL IA.RAPID: SIGNIFICANT CHANGE UP
MCHC RBC-ENTMCNC: 31.2 PG — SIGNIFICANT CHANGE UP (ref 27–34)
MCHC RBC-ENTMCNC: 32.6 GM/DL — SIGNIFICANT CHANGE UP (ref 32–36)
MCV RBC AUTO: 95.7 FL — SIGNIFICANT CHANGE UP (ref 80–100)
METHOD TYPE: SIGNIFICANT CHANGE UP
ORGANISM # SPEC MICROSCOPIC CNT: SIGNIFICANT CHANGE UP
ORGANISM # SPEC MICROSCOPIC CNT: SIGNIFICANT CHANGE UP
PLATELET # BLD AUTO: 251 K/UL — SIGNIFICANT CHANGE UP (ref 150–400)
POTASSIUM SERPL-MCNC: 4.2 MMOL/L — SIGNIFICANT CHANGE UP (ref 3.5–5.3)
POTASSIUM SERPL-SCNC: 4.2 MMOL/L — SIGNIFICANT CHANGE UP (ref 3.5–5.3)
RBC # BLD: 3.72 M/UL — LOW (ref 3.8–5.2)
RBC # FLD: 11.9 % — SIGNIFICANT CHANGE UP (ref 10.3–14.5)
SODIUM SERPL-SCNC: 140 MMOL/L — SIGNIFICANT CHANGE UP (ref 135–145)
SPECIMEN SOURCE: SIGNIFICANT CHANGE UP
WBC # BLD: 7.75 K/UL — SIGNIFICANT CHANGE UP (ref 3.8–10.5)
WBC # FLD AUTO: 7.75 K/UL — SIGNIFICANT CHANGE UP (ref 3.8–10.5)

## 2019-08-19 PROCEDURE — 85027 COMPLETE CBC AUTOMATED: CPT

## 2019-08-19 PROCEDURE — 85730 THROMBOPLASTIN TIME PARTIAL: CPT

## 2019-08-19 PROCEDURE — 36415 COLL VENOUS BLD VENIPUNCTURE: CPT

## 2019-08-19 PROCEDURE — 80053 COMPREHEN METABOLIC PANEL: CPT

## 2019-08-19 PROCEDURE — 86703 HIV-1/HIV-2 1 RESULT ANTBDY: CPT

## 2019-08-19 PROCEDURE — 87186 SC STD MICRODIL/AGAR DIL: CPT

## 2019-08-19 PROCEDURE — 87040 BLOOD CULTURE FOR BACTERIA: CPT

## 2019-08-19 PROCEDURE — 99239 HOSP IP/OBS DSCHRG MGMT >30: CPT

## 2019-08-19 PROCEDURE — 99285 EMERGENCY DEPT VISIT HI MDM: CPT | Mod: 25

## 2019-08-19 PROCEDURE — 96365 THER/PROPH/DIAG IV INF INIT: CPT

## 2019-08-19 PROCEDURE — 84702 CHORIONIC GONADOTROPIN TEST: CPT

## 2019-08-19 PROCEDURE — 80048 BASIC METABOLIC PNL TOTAL CA: CPT

## 2019-08-19 PROCEDURE — 87086 URINE CULTURE/COLONY COUNT: CPT

## 2019-08-19 PROCEDURE — 85610 PROTHROMBIN TIME: CPT

## 2019-08-19 PROCEDURE — 84443 ASSAY THYROID STIM HORMONE: CPT

## 2019-08-19 PROCEDURE — 96366 THER/PROPH/DIAG IV INF ADDON: CPT

## 2019-08-19 PROCEDURE — 83605 ASSAY OF LACTIC ACID: CPT

## 2019-08-19 PROCEDURE — 71045 X-RAY EXAM CHEST 1 VIEW: CPT

## 2019-08-19 PROCEDURE — 81001 URINALYSIS AUTO W/SCOPE: CPT

## 2019-08-19 PROCEDURE — T1013: CPT

## 2019-08-19 RX ADMIN — CEFTRIAXONE 100 MILLIGRAM(S): 500 INJECTION, POWDER, FOR SOLUTION INTRAMUSCULAR; INTRAVENOUS at 05:08

## 2019-08-19 NOTE — DISCHARGE NOTE NURSING/CASE MANAGEMENT/SOCIAL WORK - NSDCDPATPORTLINK_GEN_ALL_CORE
You can access the [facility] Patient Portal, offered by [facility], by registering with the following website: [email or web address]

## 2019-08-19 NOTE — PROGRESS NOTE PEDS - SUBJECTIVE AND OBJECTIVE BOX
SONIA DAVILA  ----------------------------------------  The patient was seen and evaluated for urinary tract infection.  The patient is in no acute distress.  Denied any chest pain, palpitations, dyspnea, or abdominal pain.  Offers no complaints.    Vital Signs Last 24 Hrs  T(C): 36.9 (19 Aug 2019 07:37), Max: 37.5 (18 Aug 2019 17:58)  T(F): 98.5 (19 Aug 2019 07:37), Max: 99.5 (18 Aug 2019 17:58)  HR: 105 (19 Aug 2019 07:37) (83 - 111)  BP: 108/74 (19 Aug 2019 07:37) (88/57 - 113/82)  BP(mean): --  RR: 18 (19 Aug 2019 07:37) (16 - 18)  SpO2: 100% (19 Aug 2019 07:37) (100% - 100%)    PHYSICAL EXAMINATION:  ----------------------------------------  General appearance: No acute distress, Awake, Alert  HEENT: Normocephalic, Atraumatic, Conjunctiva clear, EOMI  Neck: Supple, No JVD, No tenderness  Lungs: Breath sound equal bilaterally, No wheezes, No rales  Cardiovascular: S1S2, Regular rhythm  Abdomen: Soft, Nontender, Nondistended, No guarding/rebound, Positive bowel sounds  Extremities: No clubbing, No cyanosis, No edema, No calf tenderness  Neuro: Strength equal bilaterally, No tremors  Psychiatric: Appropriate mood, Normal affect    LABORATORY STUDIES:  ----------------------------------------             11.6   7.75  )-----------( 251      ( 19 Aug 2019 05:52 )             35.6     08-19    140  |  109<H>  |  4.0<L>  ----------------------------<  101  4.2   |  20.0<L>  |  0.34<L>    Ca    8.9      19 Aug 2019 05:52    Culture - Urine (collected 17 Aug 2019 08:42)  Source: .Urine  Final Report (19 Aug 2019 09:50):    >100,000 CFU/ml Escherichia coli    <10,000 CFU/ml Gram Positive Cocci in Clusters  Organism: Escherichia coli (19 Aug 2019 09:50)  Organism: Escherichia coli (19 Aug 2019 09:50)    Culture - Blood (collected 17 Aug 2019 06:43)  Source: .Blood  Preliminary Report (19 Aug 2019 07:00):    No growth at 48 hours    Culture - Blood (collected 17 Aug 2019 06:43)  Source: .Blood  Preliminary Report (19 Aug 2019 07:00):    No growth at 48 hours    MEDICATIONS  (STANDING):  cefTRIAXone   IVPB 1000 milliGRAM(s) IV Intermittent every 24 hours  saccharomyces boulardii 250 milliGRAM(s) Oral two times a day  sodium chloride 0.9%. 1000 milliLiter(s) (125 mL/Hr) IV Continuous <Continuous>    MEDICATIONS  (PRN):  acetaminophen   Tablet .. 650 milliGRAM(s) Oral every 6 hours PRN Temp greater or equal to 38C (100.4F), Mild Pain (1 - 3)      ASSESSMENT / PLAN:  ----------------------------------------  18F with urinary tract infection, fevers, chills, tachycardia, and leukocytosis    Sepsis / Urinary tract infection - On ceftriaxone, urine culture grew ecoli. Tachycardia improved. No recurrent fevers, leukocytosis resolved.    Hyponatremia - Resolved.    Hypokalemia - Potassium supplemented with improvement.    Elevated HCG - Test results discussed with the patient. She is to follow up at the clinic for further management and prenatal care. SONIA DAVILA  ----------------------------------------  The patient was seen and evaluated for urinary tract infection.  The patient is in no acute distress.  Denied any chest pain, palpitations, dyspnea, or abdominal pain.  Offers no complaints.    Vital Signs Last 24 Hrs  T(C): 36.9 (19 Aug 2019 07:37), Max: 37.5 (18 Aug 2019 17:58)  T(F): 98.5 (19 Aug 2019 07:37), Max: 99.5 (18 Aug 2019 17:58)  HR: 105 (19 Aug 2019 07:37) (83 - 111)  BP: 108/74 (19 Aug 2019 07:37) (88/57 - 113/82)  BP(mean): --  RR: 18 (19 Aug 2019 07:37) (16 - 18)  SpO2: 100% (19 Aug 2019 07:37) (100% - 100%)    PHYSICAL EXAMINATION:  ----------------------------------------  General appearance: No acute distress, Awake, Alert  HEENT: Normocephalic, Atraumatic, Conjunctiva clear, EOMI  Neck: Supple, No JVD, No tenderness  Lungs: Breath sound equal bilaterally, No wheezes, No rales  Cardiovascular: S1S2, Regular rhythm  Abdomen: Soft, Nontender, Nondistended, No guarding/rebound, Positive bowel sounds  Extremities: No clubbing, No cyanosis, No edema, No calf tenderness  Neuro: Strength equal bilaterally, No tremors  Psychiatric: Appropriate mood, Normal affect    LABORATORY STUDIES:  ----------------------------------------             11.6   7.75  )-----------( 251      ( 19 Aug 2019 05:52 )             35.6     08-19    140  |  109<H>  |  4.0<L>  ----------------------------<  101  4.2   |  20.0<L>  |  0.34<L>    Ca    8.9      19 Aug 2019 05:52    Culture - Urine (collected 17 Aug 2019 08:42)  Source: .Urine  Final Report (19 Aug 2019 09:50):    >100,000 CFU/ml Escherichia coli    <10,000 CFU/ml Gram Positive Cocci in Clusters  Organism: Escherichia coli (19 Aug 2019 09:50)  Organism: Escherichia coli (19 Aug 2019 09:50)    Culture - Blood (collected 17 Aug 2019 06:43)  Source: .Blood  Preliminary Report (19 Aug 2019 07:00):    No growth at 48 hours    Culture - Blood (collected 17 Aug 2019 06:43)  Source: .Blood  Preliminary Report (19 Aug 2019 07:00):    No growth at 48 hours    MEDICATIONS  (STANDING):  cefTRIAXone   IVPB 1000 milliGRAM(s) IV Intermittent every 24 hours  saccharomyces boulardii 250 milliGRAM(s) Oral two times a day  sodium chloride 0.9%. 1000 milliLiter(s) (125 mL/Hr) IV Continuous <Continuous>    MEDICATIONS  (PRN):  acetaminophen   Tablet .. 650 milliGRAM(s) Oral every 6 hours PRN Temp greater or equal to 38C (100.4F), Mild Pain (1 - 3)      ASSESSMENT / PLAN:  ----------------------------------------  18F with urinary tract infection, fevers, chills, tachycardia, and leukocytosis    Sepsis / Urinary tract infection - Completed the course of ceftriaxone, urine culture grew ecoli. Tachycardia improved. No recurrent fevers, leukocytosis resolved.    Hyponatremia - Resolved.    Hypokalemia - Potassium supplemented with improvement.    Elevated HCG - Test results discussed with the patient. She is to follow up at the clinic for further management and prenatal care.

## 2019-08-19 NOTE — DISCHARGE NOTE PROVIDER - NSDCCPCAREPLAN_GEN_ALL_CORE_FT
PRINCIPAL DISCHARGE DIAGNOSIS  Diagnosis: UTI (urinary tract infection)  Assessment and Plan of Treatment: The course of antibiotics was completed. Follow up with your primary care physician for further management.      SECONDARY DISCHARGE DIAGNOSES  Diagnosis: High human chorionic gonadotropin (hCG) level  Assessment and Plan of Treatment: Follow up with OB/GYN for further management.

## 2019-08-22 LAB
CULTURE RESULTS: SIGNIFICANT CHANGE UP
CULTURE RESULTS: SIGNIFICANT CHANGE UP
SPECIMEN SOURCE: SIGNIFICANT CHANGE UP
SPECIMEN SOURCE: SIGNIFICANT CHANGE UP

## 2019-10-20 ENCOUNTER — EMERGENCY (EMERGENCY)
Facility: HOSPITAL | Age: 19
LOS: 1 days | Discharge: HOME | End: 2019-10-20
Attending: EMERGENCY MEDICINE
Payer: COMMERCIAL

## 2019-10-20 VITALS
OXYGEN SATURATION: 99 % | RESPIRATION RATE: 18 BRPM | SYSTOLIC BLOOD PRESSURE: 100 MMHG | TEMPERATURE: 98 F | DIASTOLIC BLOOD PRESSURE: 69 MMHG | HEIGHT: 60 IN | WEIGHT: 143.08 LBS | HEART RATE: 82 BPM

## 2019-10-20 PROCEDURE — 99283 EMERGENCY DEPT VISIT LOW MDM: CPT

## 2019-10-20 PROCEDURE — T1013: CPT

## 2019-10-20 RX ORDER — ACETAMINOPHEN 500 MG
975 TABLET ORAL ONCE
Refills: 0 | Status: COMPLETED | OUTPATIENT
Start: 2019-10-20 | End: 2019-10-20

## 2019-10-20 RX ORDER — AZITHROMYCIN 500 MG/1
500 TABLET, FILM COATED ORAL ONCE
Refills: 0 | Status: COMPLETED | OUTPATIENT
Start: 2019-10-20 | End: 2019-10-20

## 2019-10-20 RX ORDER — AZITHROMYCIN 500 MG/1
1 TABLET, FILM COATED ORAL
Qty: 1 | Refills: 0
Start: 2019-10-20 | End: 2019-10-24

## 2019-10-20 RX ADMIN — AZITHROMYCIN 500 MILLIGRAM(S): 500 TABLET, FILM COATED ORAL at 05:06

## 2019-10-20 RX ADMIN — Medication 975 MILLIGRAM(S): at 05:05

## 2019-10-20 NOTE — ED PROVIDER NOTE - CLINICAL SUMMARY MEDICAL DECISION MAKING FREE TEXT BOX
Pt in ED for right ear pain x 2 hours, bullous myringitis on exam. Will tx pain with tylenol, azithro for infx. VSS, in NAD, stable for dc at this time. F/u ENT, referral provided.

## 2019-10-20 NOTE — ED PROVIDER NOTE - PATIENT PORTAL LINK FT
You can access the FollowMyHealth Patient Portal offered by [facility] by registering at the following website: [email or web address]. By joining [facility]’s FollowMyHealth portal, you will also be able to view your health information using other applications (apps) compatible with our system.

## 2019-10-20 NOTE — ED PROVIDER NOTE - PHYSICAL EXAMINATION
Const: Awake, alert and oriented. In no acute distress. Well appearing.  HEENT: NC/AT. Moist mucous membranes. Left TM wnl, Right TM erythematous, bulging with small blister noted.  Eyes: No scleral icterus. PERRLA. EOMI.  Cardiac: Regular rate and regular rhythm. +S1/S2. Peripheral pulses 2+ and symmetric. No LE edema.  Resp: Speaking in full sentences. No evidence of respiratory distress. No wheezes, rales or rhonchi.  Skin: No rashes, abrasions or lacerations.  Neuro: Awake, alert & oriented x 3. Moves all extremities symmetrically.

## 2019-10-20 NOTE — ED PROVIDER NOTE - OBJECTIVE STATEMENT
20yo female no pmhx presents to ED for right ear pain onset 2 hours prior to arrival. Pt did not take any medication for pain control prior to arrival. No discharge from ear. No sick contacts. Denies fever, chills, headache, throat pain, dizziness, CP, SOB. 20yo female 12 weeks pregnant, no pmhx presents to ED for right ear pain onset 2 hours prior to arrival. Pt did not take any medication for pain control prior to arrival. No discharge from ear. No sick contacts. Denies fever, chills, headache, throat pain, dizziness, CP, SOB.  : Niko Jovel

## 2019-10-20 NOTE — ED PROVIDER NOTE - ATTENDING CONTRIBUTION TO CARE
19y odl with complaints od pain, acute onset, erythema blister appearing, vikash to treat with pain meds, abox, I personally saw the patient with the PA, and completed the key components of the history and physical exam. I then discussed the management plan with the PA.

## 2019-10-31 ENCOUNTER — EMERGENCY (EMERGENCY)
Facility: HOSPITAL | Age: 19
LOS: 1 days | Discharge: HOME | End: 2019-10-31
Attending: EMERGENCY MEDICINE
Payer: COMMERCIAL

## 2019-10-31 VITALS
HEART RATE: 93 BPM | TEMPERATURE: 98 F | OXYGEN SATURATION: 97 % | DIASTOLIC BLOOD PRESSURE: 58 MMHG | RESPIRATION RATE: 20 BRPM | HEIGHT: 60 IN | WEIGHT: 143.08 LBS | SYSTOLIC BLOOD PRESSURE: 98 MMHG

## 2019-10-31 PROCEDURE — T1013: CPT

## 2019-10-31 PROCEDURE — 99284 EMERGENCY DEPT VISIT MOD MDM: CPT

## 2019-10-31 RX ORDER — DIPHENHYDRAMINE HCL 50 MG
25 CAPSULE ORAL ONCE
Refills: 0 | Status: DISCONTINUED | OUTPATIENT
Start: 2019-10-31 | End: 2019-10-31

## 2019-10-31 NOTE — ED PROVIDER NOTE - ATTENDING CONTRIBUTION TO CARE
hosp : [facility].  today at 1300, while eating (eggs) reports that eyes felt "irritated" and face felt itchy.  Reports mild diff swallowing and diff breathing with symptoms.  Denies rash to body but notes that eye appear darker.  Reports coughing and sneezing since yesterday.  pregnant: approx 15 wks gestation.  PE:  nontoxic appearing ,NARD, no hoarse voice,  uvula midline, no lip or tongue swelling, no chemosis, bilateral periorbital petechiae involving palpebral conjunctiva, chest CTA, no wheezing, no ecchymosis, no epistaxis.  A/P  petechiae likely due to coughing/ sneezing.

## 2019-10-31 NOTE — ED PROVIDER NOTE - OBJECTIVE STATEMENT
Pt is a 20 y/o f, 18 weeks gestation, NO PMH, presents to ED c/o facial rash. Pt states she was eating eggs 1 hour PTA and developed a rash on her face. Pt states she has no known allergies and has never had a reaction to eggs in the past. Pt states she has no SOB or facial swelling. Pt has not taken any medication PTA in attempt to alleviate her symptoms. Pt has no other complaints at this time. Pt denies fevers, chills, belly pain, SOB, odynophagia, dysphagia.    ED : oNrma Pt is a 20 y/o f, 18 weeks gestation, NO PMH, presents to ED c/o facial rash. Pt states she was eating eggs 1 hour PTA and developed a rash on her face. Pt states she has no known allergies and has never had a reaction to eggs in the past. Pt states she has no SOB or facial swelling. Pt also admits to episodes of sneezing / cough since this AM. Pt has not taken any medication PTA in attempt to alleviate her symptoms. Pt has no other complaints at this time. Pt denies fevers, chills, belly pain, SOB, odynophagia, dysphagia.    ED : Norma

## 2019-10-31 NOTE — ED PROVIDER NOTE - CLINICAL SUMMARY MEDICAL DECISION MAKING FREE TEXT BOX
19f with petechial facial rash after episodes of coughing / sneezing this AM. no signs of angioedema and lung CTA on physical exam. Bedside US performed to confirm FHR. Pt advised to apply ice to area and follow up with PCP. No signs of acute bleeding at this time.

## 2019-10-31 NOTE — ED PROVIDER NOTE - PATIENT PORTAL LINK FT
You can access the FollowMyHealth Patient Portal offered by [facility] by registering at the following website: [email or web address]. By joining [employer]’s FollowMyHealth portal, you will also be able to view your health information using other applications (apps) compatible with our system.

## 2019-12-06 PROBLEM — Z00.00 ENCOUNTER FOR PREVENTIVE HEALTH EXAMINATION: Status: ACTIVE | Noted: 2019-12-06

## 2019-12-11 ENCOUNTER — ASOB RESULT (OUTPATIENT)
Age: 19
End: 2019-12-11

## 2019-12-11 ENCOUNTER — APPOINTMENT (OUTPATIENT)
Dept: ANTEPARTUM | Facility: CLINIC | Age: 19
End: 2019-12-11
Payer: MEDICAID

## 2019-12-11 PROCEDURE — 76805 OB US >/= 14 WKS SNGL FETUS: CPT

## 2020-02-05 ENCOUNTER — APPOINTMENT (OUTPATIENT)
Dept: ANTEPARTUM | Facility: CLINIC | Age: 20
End: 2020-02-05
Payer: MEDICAID

## 2020-02-05 ENCOUNTER — ASOB RESULT (OUTPATIENT)
Age: 20
End: 2020-02-05

## 2020-02-05 PROCEDURE — 76816 OB US FOLLOW-UP PER FETUS: CPT

## 2020-04-01 ENCOUNTER — APPOINTMENT (OUTPATIENT)
Dept: ANTEPARTUM | Facility: CLINIC | Age: 20
End: 2020-04-01
Payer: MEDICAID

## 2020-04-01 ENCOUNTER — ASOB RESULT (OUTPATIENT)
Age: 20
End: 2020-04-01

## 2020-04-01 VITALS — TEMPERATURE: 98.8 F

## 2020-04-01 PROCEDURE — 76816 OB US FOLLOW-UP PER FETUS: CPT

## 2020-04-01 PROCEDURE — 76819 FETAL BIOPHYS PROFIL W/O NST: CPT

## 2020-04-26 ENCOUNTER — OUTPATIENT (OUTPATIENT)
Dept: INPATIENT UNIT | Facility: HOSPITAL | Age: 20
LOS: 1 days | End: 2020-04-26
Payer: COMMERCIAL

## 2020-04-26 VITALS
TEMPERATURE: 98 F | RESPIRATION RATE: 17 BRPM | DIASTOLIC BLOOD PRESSURE: 70 MMHG | HEART RATE: 90 BPM | SYSTOLIC BLOOD PRESSURE: 113 MMHG

## 2020-04-26 VITALS — DIASTOLIC BLOOD PRESSURE: 70 MMHG | HEART RATE: 83 BPM | SYSTOLIC BLOOD PRESSURE: 110 MMHG

## 2020-04-26 VITALS — DIASTOLIC BLOOD PRESSURE: 70 MMHG | SYSTOLIC BLOOD PRESSURE: 113 MMHG | HEART RATE: 90 BPM

## 2020-04-26 DIAGNOSIS — O47.1 FALSE LABOR AT OR AFTER 37 COMPLETED WEEKS OF GESTATION: ICD-10-CM

## 2020-04-26 PROCEDURE — 59025 FETAL NON-STRESS TEST: CPT

## 2020-04-26 PROCEDURE — G0463: CPT

## 2020-04-26 NOTE — OB PROVIDER TRIAGE NOTE - ABORTIONS, OB PROFILE
[No Acute Distress] : no acute distress [Well Nourished] : well nourished [Well Developed] : well developed 0 [Well-Appearing] : well-appearing [Normal Sclera/Conjunctiva] : normal sclera/conjunctiva [EOMI] : extraocular movements intact [Normal Outer Ear/Nose] : the outer ears and nose were normal in appearance [Normal Oropharynx] : the oropharynx was normal [Normal TMs] : both tympanic membranes were normal [No Lymphadenopathy] : no lymphadenopathy [Supple] : supple [No Respiratory Distress] : no respiratory distress  [No Accessory Muscle Use] : no accessory muscle use [Normal Percussion] : the chest was normal to percussion [Clear to Auscultation] : lungs were clear to auscultation bilaterally [Normal Rate] : normal rate  [Regular Rhythm] : with a regular rhythm [Normal S1, S2] : normal S1 and S2 [No Murmur] : no murmur heard [Normal Supraclavicular Nodes] : no supraclavicular lymphadenopathy [Normal Posterior Cervical Nodes] : no posterior cervical lymphadenopathy [Normal Anterior Cervical Nodes] : no anterior cervical lymphadenopathy [de-identified] : no conj d/c, no injection [de-identified] : turbinates boggy; sinuses transilluminate, minimal whitish PND on pharynx, no exudate, no oral lesions [de-identified] : no wheeze, ronchi noted

## 2020-04-26 NOTE — OB PROVIDER TRIAGE NOTE - NSHPPHYSICALEXAM_GEN_ALL_CORE
Vital Signs Last 24 Hrs  T(C): 36.8 (26 Apr 2020 11:29), Max: 36.8 (26 Apr 2020 11:29)  T(F): 98.2 (26 Apr 2020 11:29), Max: 98.2 (26 Apr 2020 11:29)  HR: 90 (26 Apr 2020 11:36) (90 - 90)  BP: 113/70 (26 Apr 2020 11:36) (113/70 - 113/70)  RR: 17 (26 Apr 2020 11:29) (17 - 17)  General: Alert and oriented x3, no acute distress  Pelvic: 0.5/0/-3  FHT: baseline 130bpm, moderate variability, +accels, -deccels  [place]: irregular contractions q5-7 minutes

## 2020-04-26 NOTE — OB PROVIDER TRIAGE NOTE - HISTORY OF PRESENT ILLNESS
SONIA DAVILA is a 19y  at 40w who presented to L&D with contractions every 10 minutes. Patient denies leakage of fluid, vaginal bleeding, and reports good fetal movement. Contractions are a 4/10 in severity. This pregnancy has been uncomplicated.   pmhx: none  pshx: none  meds: pnv  allergies: nkda

## 2020-04-26 NOTE — OB PROVIDER TRIAGE NOTE - NSOBPROVIDERNOTE_OBGYN_ALL_OB_FT
Patient is a 19y  at 40w who presented to L&D for rule out labor. Physical exam shows that she is in the latent phase of labor and can return when contractions are closer together and more painful.     Plan  - f/u outpatient with [facility]  - return to L&D if contractions are more frequent, painful, leakage of fluid, lack of fetal movement, or vaginal bleeding    d/w Dr. Richards

## 2020-04-27 ENCOUNTER — TRANSCRIPTION ENCOUNTER (OUTPATIENT)
Age: 20
End: 2020-04-27

## 2020-04-27 ENCOUNTER — INPATIENT (INPATIENT)
Facility: HOSPITAL | Age: 20
LOS: 2 days | Discharge: HOME | End: 2020-04-30
Attending: OBSTETRICS & GYNECOLOGY | Admitting: OBSTETRICS & GYNECOLOGY
Payer: COMMERCIAL

## 2020-04-27 DIAGNOSIS — O26.893 OTHER SPECIFIED PREGNANCY RELATED CONDITIONS, THIRD TRIMESTER: ICD-10-CM

## 2020-04-27 DIAGNOSIS — O47.1 FALSE LABOR AT OR AFTER 37 COMPLETED WEEKS OF GESTATION: ICD-10-CM

## 2020-04-27 LAB
BASOPHILS # BLD AUTO: 0.02 K/UL — SIGNIFICANT CHANGE UP (ref 0–0.2)
BASOPHILS NFR BLD AUTO: 0.3 % — SIGNIFICANT CHANGE UP (ref 0–2)
EOSINOPHIL # BLD AUTO: 0.05 K/UL — SIGNIFICANT CHANGE UP (ref 0–0.5)
EOSINOPHIL NFR BLD AUTO: 0.6 % — SIGNIFICANT CHANGE UP (ref 0–6)
HCT VFR BLD CALC: 33.2 % — LOW (ref 34.5–45)
HGB BLD-MCNC: 11.3 G/DL — LOW (ref 11.5–15.5)
IMM GRANULOCYTES NFR BLD AUTO: 1.6 % — HIGH (ref 0–1.5)
LYMPHOCYTES # BLD AUTO: 2.58 K/UL — SIGNIFICANT CHANGE UP (ref 1–3.3)
LYMPHOCYTES # BLD AUTO: 32.4 % — SIGNIFICANT CHANGE UP (ref 13–44)
MCHC RBC-ENTMCNC: 32 PG — SIGNIFICANT CHANGE UP (ref 27–34)
MCHC RBC-ENTMCNC: 34 GM/DL — SIGNIFICANT CHANGE UP (ref 32–36)
MCV RBC AUTO: 94.1 FL — SIGNIFICANT CHANGE UP (ref 80–100)
MONOCYTES # BLD AUTO: 0.63 K/UL — SIGNIFICANT CHANGE UP (ref 0–0.9)
MONOCYTES NFR BLD AUTO: 7.9 % — SIGNIFICANT CHANGE UP (ref 2–14)
NEUTROPHILS # BLD AUTO: 4.56 K/UL — SIGNIFICANT CHANGE UP (ref 1.8–7.4)
NEUTROPHILS NFR BLD AUTO: 57.2 % — SIGNIFICANT CHANGE UP (ref 43–77)
PLATELET # BLD AUTO: 141 K/UL — LOW (ref 150–400)
RBC # BLD: 3.53 M/UL — LOW (ref 3.8–5.2)
RBC # FLD: 14.7 % — HIGH (ref 10.3–14.5)
SARS-COV-2 RNA SPEC QL NAA+PROBE: SIGNIFICANT CHANGE UP
WBC # BLD: 7.97 K/UL — SIGNIFICANT CHANGE UP (ref 3.8–10.5)
WBC # FLD AUTO: 7.97 K/UL — SIGNIFICANT CHANGE UP (ref 3.8–10.5)

## 2020-04-27 RX ORDER — CITRIC ACID/SODIUM CITRATE 300-500 MG
30 SOLUTION, ORAL ORAL ONCE
Refills: 0 | Status: COMPLETED | OUTPATIENT
Start: 2020-04-27 | End: 2020-04-28

## 2020-04-27 RX ORDER — OXYTOCIN 10 UNIT/ML
333.33 VIAL (ML) INJECTION
Qty: 20 | Refills: 0 | Status: COMPLETED | OUTPATIENT
Start: 2020-04-27 | End: 2020-04-28

## 2020-04-27 RX ORDER — SODIUM CHLORIDE 9 MG/ML
1000 INJECTION, SOLUTION INTRAVENOUS
Refills: 0 | Status: DISCONTINUED | OUTPATIENT
Start: 2020-04-27 | End: 2020-04-28

## 2020-04-27 RX ADMIN — SODIUM CHLORIDE 125 MILLILITER(S): 9 INJECTION, SOLUTION INTRAVENOUS at 22:48

## 2020-04-27 NOTE — OB PROVIDER H&P - ASSESSMENT
20yo  at 40 weeks admitted for IOL 2/2 PROM    - admit to L&D  - admission labs and COVID-19 sent  - consent at bedside  - will begin induction with pitocin    discussed with Dr. De Dios 20yo  at 40 1/7 weeks gestation admitted for IOL 2/2 PROM    - admit to L&D  - admission labs and COVID-19 sent  - consent at bedside  - will begin induction with pitocin    discussed with Dr. De Dios 20yo  at 40 1/7 weeks gestation admitted for IOL 2/2 PROM vs early labor    - admit to L&D  - admission labs and COVID-19 sent  - consent at bedside  - expectant management for now. will reassess for cervical change in a few hours. will consider pitocin augmentation at that time    discussed with Dr. De Dios

## 2020-04-27 NOTE — OB RN PATIENT PROFILE - PRO PRENATAL LABS ORI SOURCE ANTIBODY SCREEN
Telephone Encounter by Keri Pennington MD at 04/18/18 05:37 AM     Author:  Keri Pennington MD Service:  (none) Author Type:  Physician     Filed:  04/18/18 05:53 AM Encounter Date:  4/17/2018 Status:  Addendum     :  Keri Pennington MD (Physician)            This issue has been ongoing > 6 months and is not acute    Is she taking seasonique?  If so, she needs to change to a month to month pill because the bleeding when she shouldn't be is likely secondary to the pill itself. May need to try alternative progesterone in the pill.    Her US in august 2017 was essentially normal--though there was question of an endometrial polyp but a sonohystergram did not show this in follow-up.  The cysts she had at the time on her right and left ovary are expected in a 35 y/o female.  Endometrial biopsy then was also normal  She can certainly repeat US if feeling fullness  Urination issues are not likely related to period issues. Can do UTI screen    You can offer Camilo (4-19 or 5-31) assuming there are spots. But I will be OOO and cannot otherwise see her any sooner.[HW1.1M]  Needs AAP please[HW1.2M]    Electronically Signed by:    Keri Pennington MD , 4/18/2018[HW1.3T]            Revision History        User Key Date/Time User Provider Type Action    > HW1.2 04/18/18 05:53 AM Keri Pennington MD Physician Addend     HW1.3 04/18/18 05:45 AM Keri Pennington MD Physician Sign     HW1.1 04/18/18 05:37 AM Keri Pennington MD Physician     M - Manual, T - Template             hard copy, drawn during this pregnancy

## 2020-04-27 NOTE — OB PROVIDER H&P - HISTORY OF PRESENT ILLNESS
Patient is a 18yo  at 40 1/7 weeks gestation presenting to L&D for leakage of fluid.    She states she felt a large gush of fluid around 9pm which prompted her arrival on L&D. She reports good fetal movement and some contractions. She denies vaginal bleeding    This pregnancy has been uncomplicated    OBHx: Denies  PMH: denies  PSH: denies  Meds: PNV  All: NKDA

## 2020-04-28 ENCOUNTER — TRANSCRIPTION ENCOUNTER (OUTPATIENT)
Age: 20
End: 2020-04-28

## 2020-04-28 DIAGNOSIS — Z3A.40 40 WEEKS GESTATION OF PREGNANCY: ICD-10-CM

## 2020-04-28 DIAGNOSIS — O42.92 FULL-TERM PREMATURE RUPTURE OF MEMBRANES, UNSPECIFIED AS TO LENGTH OF TIME BETWEEN RUPTURE AND ONSET OF LABOR: ICD-10-CM

## 2020-04-28 LAB
ABO RH CONFIRMATION: SIGNIFICANT CHANGE UP
BLD GP AB SCN SERPL QL: SIGNIFICANT CHANGE UP
T PALLIDUM AB TITR SER: NEGATIVE — SIGNIFICANT CHANGE UP

## 2020-04-28 RX ORDER — OXYTOCIN 10 UNIT/ML
2 VIAL (ML) INJECTION
Qty: 30 | Refills: 0 | Status: DISCONTINUED | OUTPATIENT
Start: 2020-04-28 | End: 2020-04-30

## 2020-04-28 RX ORDER — ACETAMINOPHEN 500 MG
975 TABLET ORAL
Refills: 0 | Status: DISCONTINUED | OUTPATIENT
Start: 2020-04-28 | End: 2020-04-30

## 2020-04-28 RX ORDER — CEFAZOLIN SODIUM 1 G
2000 VIAL (EA) INJECTION ONCE
Refills: 0 | Status: COMPLETED | OUTPATIENT
Start: 2020-04-28 | End: 2020-04-28

## 2020-04-28 RX ORDER — TETANUS TOXOID, REDUCED DIPHTHERIA TOXOID AND ACELLULAR PERTUSSIS VACCINE, ADSORBED 5; 2.5; 8; 8; 2.5 [IU]/.5ML; [IU]/.5ML; UG/.5ML; UG/.5ML; UG/.5ML
0.5 SUSPENSION INTRAMUSCULAR ONCE
Refills: 0 | Status: DISCONTINUED | OUTPATIENT
Start: 2020-04-28 | End: 2020-04-30

## 2020-04-28 RX ORDER — AZITHROMYCIN 500 MG/1
500 TABLET, FILM COATED ORAL ONCE
Refills: 0 | Status: COMPLETED | OUTPATIENT
Start: 2020-04-28 | End: 2020-04-28

## 2020-04-28 RX ORDER — DIPHENHYDRAMINE HCL 50 MG
25 CAPSULE ORAL EVERY 4 HOURS
Refills: 0 | Status: DISCONTINUED | OUTPATIENT
Start: 2020-04-28 | End: 2020-04-30

## 2020-04-28 RX ORDER — OXYTOCIN 10 UNIT/ML
333.33 VIAL (ML) INJECTION
Qty: 20 | Refills: 0 | Status: DISCONTINUED | OUTPATIENT
Start: 2020-04-28 | End: 2020-04-30

## 2020-04-28 RX ORDER — METRONIDAZOLE 500 MG
500 TABLET ORAL ONCE
Refills: 0 | Status: COMPLETED | OUTPATIENT
Start: 2020-04-28 | End: 2020-04-28

## 2020-04-28 RX ORDER — DIPHENHYDRAMINE HCL 50 MG
25 CAPSULE ORAL EVERY 6 HOURS
Refills: 0 | Status: DISCONTINUED | OUTPATIENT
Start: 2020-04-28 | End: 2020-04-30

## 2020-04-28 RX ORDER — KETOROLAC TROMETHAMINE 30 MG/ML
15 SYRINGE (ML) INJECTION ONCE
Refills: 0 | Status: DISCONTINUED | OUTPATIENT
Start: 2020-04-28 | End: 2020-04-30

## 2020-04-28 RX ORDER — OXYCODONE HYDROCHLORIDE 5 MG/1
5 TABLET ORAL
Refills: 0 | Status: DISCONTINUED | OUTPATIENT
Start: 2020-04-28 | End: 2020-04-30

## 2020-04-28 RX ORDER — ONDANSETRON 8 MG/1
4 TABLET, FILM COATED ORAL EVERY 6 HOURS
Refills: 0 | Status: DISCONTINUED | OUTPATIENT
Start: 2020-04-28 | End: 2020-04-30

## 2020-04-28 RX ORDER — NALOXONE HYDROCHLORIDE 4 MG/.1ML
0.1 SPRAY NASAL
Refills: 0 | Status: DISCONTINUED | OUTPATIENT
Start: 2020-04-28 | End: 2020-04-30

## 2020-04-28 RX ORDER — SIMETHICONE 80 MG/1
80 TABLET, CHEWABLE ORAL EVERY 4 HOURS
Refills: 0 | Status: DISCONTINUED | OUTPATIENT
Start: 2020-04-28 | End: 2020-04-30

## 2020-04-28 RX ORDER — IBUPROFEN 200 MG
600 TABLET ORAL EVERY 6 HOURS
Refills: 0 | Status: COMPLETED | OUTPATIENT
Start: 2020-04-28 | End: 2021-03-27

## 2020-04-28 RX ORDER — OXYCODONE HYDROCHLORIDE 5 MG/1
5 TABLET ORAL ONCE
Refills: 0 | Status: DISCONTINUED | OUTPATIENT
Start: 2020-04-28 | End: 2020-04-30

## 2020-04-28 RX ORDER — ENOXAPARIN SODIUM 100 MG/ML
40 INJECTION SUBCUTANEOUS EVERY 24 HOURS
Refills: 0 | Status: DISCONTINUED | OUTPATIENT
Start: 2020-04-28 | End: 2020-04-30

## 2020-04-28 RX ORDER — MAGNESIUM HYDROXIDE 400 MG/1
30 TABLET, CHEWABLE ORAL
Refills: 0 | Status: DISCONTINUED | OUTPATIENT
Start: 2020-04-28 | End: 2020-04-30

## 2020-04-28 RX ORDER — KETOROLAC TROMETHAMINE 30 MG/ML
30 SYRINGE (ML) INJECTION EVERY 6 HOURS
Refills: 0 | Status: DISCONTINUED | OUTPATIENT
Start: 2020-04-28 | End: 2020-04-29

## 2020-04-28 RX ORDER — SODIUM CHLORIDE 9 MG/ML
500 INJECTION, SOLUTION INTRAVENOUS ONCE
Refills: 0 | Status: DISCONTINUED | OUTPATIENT
Start: 2020-04-28 | End: 2020-04-30

## 2020-04-28 RX ORDER — LANOLIN
1 OINTMENT (GRAM) TOPICAL EVERY 6 HOURS
Refills: 0 | Status: DISCONTINUED | OUTPATIENT
Start: 2020-04-28 | End: 2020-04-30

## 2020-04-28 RX ORDER — ACETAMINOPHEN 500 MG
1000 TABLET ORAL ONCE
Refills: 0 | Status: COMPLETED | OUTPATIENT
Start: 2020-04-28 | End: 2020-04-28

## 2020-04-28 RX ORDER — SODIUM CHLORIDE 9 MG/ML
1000 INJECTION, SOLUTION INTRAVENOUS
Refills: 0 | Status: DISCONTINUED | OUTPATIENT
Start: 2020-04-28 | End: 2020-04-30

## 2020-04-28 RX ADMIN — Medication 975 MILLIGRAM(S): at 22:57

## 2020-04-28 RX ADMIN — Medication 100 MILLIGRAM(S): at 12:45

## 2020-04-28 RX ADMIN — Medication 30 MILLIGRAM(S): at 23:55

## 2020-04-28 RX ADMIN — ENOXAPARIN SODIUM 40 MILLIGRAM(S): 100 INJECTION SUBCUTANEOUS at 23:57

## 2020-04-28 RX ADMIN — Medication 1000 MILLIUNIT(S)/MIN: at 14:39

## 2020-04-28 RX ADMIN — Medication 30 MILLIGRAM(S): at 18:30

## 2020-04-28 RX ADMIN — Medication 2 MILLIUNIT(S)/MIN: at 02:26

## 2020-04-28 RX ADMIN — AZITHROMYCIN 255 MILLIGRAM(S): 500 TABLET, FILM COATED ORAL at 12:57

## 2020-04-28 RX ADMIN — Medication 100 MILLIGRAM(S): at 14:54

## 2020-04-28 RX ADMIN — Medication 400 MILLIGRAM(S): at 14:29

## 2020-04-28 RX ADMIN — Medication 30 MILLILITER(S): at 07:22

## 2020-04-28 NOTE — OB RN INTRAOPERATIVE NOTE - NS_ADDITIONALPROCINFO1_OBGYN_ALL_OB_FT
ext FH in place and reviewed by dr. page at bedside in or. 's, pt off monitor and prepped as per dr page

## 2020-04-28 NOTE — DISCHARGE NOTE OB - MATERIALS PROVIDED
Shaken Baby Prevention Handout/Breastfeeding Guide and Packet/Back To Sleep Handout/Tdap Vaccination (VIS Pub Date: 2012)/Breastfeeding Mother’s Support Group Information/Guide to Postpartum Care/[facility] Hearing Screen Program/Discharge Medication Information for Patients and Families Pocket Guide/[facility] [place] Screening Program/Birth Certificate Instructions

## 2020-04-28 NOTE — DISCHARGE NOTE OB - PATIENT PORTAL LINK FT
You can access the FollowMyHealth Patient Portal offered by [facility] by registering at the following website: [email or web address]. By joining [email or web address]’s FollowMyHealth portal, you will also be able to view your health information using other applications (apps) compatible with our system.

## 2020-04-28 NOTE — CHART NOTE - NSCHARTNOTEFT_GEN_A_CORE
Patient reporting some painful contractions, but does not desire pain medication at this time    Vital Signs Last 24 Hrs  T(C): 37.6 (28 Apr 2020 03:35), Max: 37.6 (28 Apr 2020 03:35)  T(F): 99.68 (28 Apr 2020 03:35), Max: 99.68 (28 Apr 2020 03:35)  HR: 69 (28 Apr 2020 01:05) (66 - 69)  BP: 111/56 (28 Apr 2020 01:05) (111/56 - 136/84)  RR: 14 (28 Apr 2020 01:01) (14 - 16)      FHT: baseline 125, moderate variability, + accels, - decels  [place]: ctx q2-3 min    - FHT cat 1  - Will continue pitocin at this does  - Will up-titrate if contraction pattern allows  - Will continue to monitor

## 2020-04-28 NOTE — OB NEONATOLOGY/PEDIATRICIAN DELIVERY SUMMARY - NSPEDSNEONOTESA_OBGYN_ALL_OB_FT
Baby Branden Novak is a 40.2 wk term  born at 1307 on 20 via C/S to 18 yo  A+/ GBS neg/ RPR NR/ HIV neg/ HepBSAg neg/RI mom with EDC 20.  Mom had good prenatal care.  No significant complications with pregnancy.  L&D: SROM at 2100 on ; 16 h PTD; mec stained AF.  Maternal low temp up to 100.2 F towards end of labor.   Facial presentation on evaluation.  C/S under regional anesthesia.  Upon delivery facial presentation. Vacuum-assisted extraction. Mec-stained AF.  Slightly decreased tone.  Spontaneous, vigorous cry within first minute.  Well-suctioned and dried.  AS   A/P: Term AGA  delivered by C/S. Mec fluid. Maternal low-grade fever with EOS 0.76 at birth.  Observe for respiratory distress.  Monitor for s/s of sepsis.  Transition to routine NB care under PMD/ Hospitalist service.  Shaq Daly MD

## 2020-04-28 NOTE — DISCHARGE NOTE OB - HOSPITAL COURSE
Pt is 18yo  s/p  section. She was transferred to postpartum unit without complications during her stay. Upon discharge she is voiding, tolerating PO, ambulating, and pain is well controlled.

## 2020-04-28 NOTE — DISCHARGE NOTE OB - CARE PLAN
vital signs/nurses notes
Principal Discharge DX:	 delivery delivered  Goal:	rapid recovery  Assessment and plan of treatment:	Please call your provider to schedule postoperative wound check visit in 2 weeks. Take medications as directed, regular diet, activity as tolerated. Exclusive breast feeding for the first 6 months is recommended. Nothing per vagina for 6 weeks (incl. sex, douching, etc). If you have additional concerns, please inform your provider.

## 2020-04-28 NOTE — CHART NOTE - NSCHARTNOTEFT_GEN_A_CORE
04-28-20 @ 10:16  Patient was evaluated at bedside.  Patient resting comfortably at bedside. Denies any CTX like pain    ICU Vital Signs Last 24 Hrs  T(C): 37.1 (28 Apr 2020 09:30), Max: 37.6 (28 Apr 2020 03:35)  T(F): 98.78 (28 Apr 2020 09:30), Max: 99.68 (28 Apr 2020 03:35)  HR: 66 (28 Apr 2020 10:17) (64 - 90)  BP: 129/85 (28 Apr 2020 10:14) (109/68 - 136/86)  RR: 16 (28 Apr 2020 09:30) (14 - 16)  SpO2: 98% (28 Apr 2020 10:12) (97% - 100%)      FHT: 130 bpm, moderate variability + accels, intermittent early decelerations present  [place]: Ctxs every 3-4  minutes.  SVE: 4.5/100/-1, OP presentation       PLAN:  Reactive tracing  IUPC placed without difficulty. Patient tolerated procedure well.  Patient repositioned to side, and provided 100% O2 via facemask   Continuous FHT/[place]  Maternal/fetal status reassuring  Will continue to reassess prn.

## 2020-04-28 NOTE — CHART NOTE - NSCHARTNOTEFT_GEN_A_CORE
20 @ 12:27  Patient was evaluated at bedside.  Patient presenting with oral temperature 100.2F  Patient states that she "feels shivers," but no additional complaints.    ICU Vital Signs Last 24 Hrs  T(C): 37.9 (2020 12:17), Max: 37.9 (2020 12:17)  T(F): 100.22 (2020 12:17), Max: 100.22 (2020 12:17)  HR: 78 (2020 12:14) (64 - 90)  BP: 151/81 (2020 12:14) (109/68 - 151/81)  RR: 20 (2020 12:17) (14 - 20)  SpO2: 97% (2020 10:37) (97% - 100%)        PLAN:  FH @ bedside found to be persistently in the 180s-200s despite resuscitative measures  Pelvic exam demonstrating face presentation  Pre-op antibiotics ordered: ancef 2g + azithromycin 500mg  Will proceed with non-scheduled urgent primary  section

## 2020-04-28 NOTE — CHART NOTE - NSCHARTNOTEFT_GEN_A_CORE
Patient reporting some discomfort with contractions. She does not desire pain medication at this time.    SVE: 1-2/70/-2  FHt: baseline 125, moderate variability, + accels, - decels  [place]: irregular contractions    - FHT cat 1  - Will begin pitocin at this time  - Will continue to monitor

## 2020-04-28 NOTE — OB RN DELIVERY SUMMARY - NS_SEPSISRSKCALC_OBGYN_ALL_OB_FT
EOS calculated successfully. EOS Risk Factor: 0.5/1000 live births ([facility] national incidence); GA=40w2d; Temp=100.22; ROM=16.117; GBS='Negative'; Antibiotics='No antibiotics or any antibiotics < 2 hrs prior to birth'

## 2020-04-28 NOTE — OB PROVIDER DELIVERY SUMMARY - NSPROVIDERDELIVERYNOTE_OBGYN_ALL_OB_FT
20yo  @ [id number] who initially presented for IOL secondary to PROM. Patient was initially started on pitocin; however, she continued to present with persistent cat II tracing. On pelvic exam, infant was found to be in face presentation, at which time decision was made to proceed with primary  section due to malpresentation.     Patient was taken to the OR, a primary low transverse  section was performed and a viable male infant was delivered atraumatically in face presentation at 13:07, nuchal cord x 0. Placenta delivered spontaneously and intact at 13:08. Hysterotomy closed in running locked fashion in 2 layers with excellent hemostasis noted. Rectus muscle closed in horizontal mattress fashion. Fascia closed in running fashion. Skin reapproximated with Monocryl suture in a subcuticular fashion. Excellent hemostasis was obtained at each layer of closure.  Apgars  8/9  EBL 850cc

## 2020-04-28 NOTE — DISCHARGE NOTE OB - CARE PROVIDER_API CALL
Khalida Richards)  Obstetrics and Gynecology  [address] [place] [address]  Phone: [phone]  Fax: [phone]  Follow Up Time: 2 weeks

## 2020-04-28 NOTE — DISCHARGE NOTE OB - MEDICATION SUMMARY - MEDICATIONS TO TAKE
I will START or STAY ON the medications listed below when I get home from the hospital:    acetaminophen 325 mg oral tablet  -- 2 tab(s) by mouth every 6 hours, As Needed -for mild pain   -- Indication: For     ibuprofen 600 mg oral tablet  -- 1 tab(s) by mouth every 6 hours, As Needed -for moderate pain   -- Indication: For     oxycodone-acetaminophen 5 mg-325 mg oral tablet  -- 1 tab(s) by mouth every 6 hours, As Needed -for severe pain MDD:4   -- Caution federal law prohibits the transfer of this drug to any person other  than the person for whom it was prescribed.  May cause drowsiness.  Alcohol may intensify this effect.  Use care when operating dangerous machinery.  This prescription cannot be refilled.  This product contains acetaminophen.  Do not use  with any other product containing acetaminophen to prevent possible liver damage.  Using more of this medication than prescribed may cause serious breathing problems.    -- Indication: For

## 2020-04-28 NOTE — CHART NOTE - NSCHARTNOTEFT_GEN_A_CORE
04-28-20 @ 11:59  Patient was evaluated at bedside.  Patient shivering at bedside at bedside, but afebrile on exam.     ICU Vital Signs Last 24 Hrs  T(C): 37.3 (28 Apr 2020 11:53), Max: 37.6 (28 Apr 2020 03:35)  T(F): 99.14 (28 Apr 2020 11:53), Max: 99.68 (28 Apr 2020 03:35)  HR: 71 (28 Apr 2020 11:44) (64 - 90)  BP: 145/70 (28 Apr 2020 11:44) (109/68 - 145/70)  RR: 16 (28 Apr 2020 11:53) (14 - 16)  SpO2: 97% (28 Apr 2020 10:37) (97% - 100%)        FHT: 130 bpm, moderate variability + accels, intermittent late decels present  [place]: Ctxs every 1-3 minutes.  SVE: 4.0/100/-2      PLAN:  Reactive tracing at bedside  Patient repositioned with resolution of tracing back to baseline  Patient examined by Dr. Richards. No cervical change from prior exam  Will continue with pitocin induction (currently @ 2mu)  Continuous FHT/[place]  Maternal/fetal status reassuring  Will continue to reassess prn.    Plan d/w Dr. Richards

## 2020-04-28 NOTE — CHART NOTE - NSCHARTNOTEFT_GEN_A_CORE
Patient reports painful contractions. She requests pain management at this time. Risks and benefits of both Stadol and epidural discussed. Patient desires to think about her options    Vital Signs Last 24 Hrs  T(C): 37.6 (28 Apr 2020 03:35), Max: 37.6 (28 Apr 2020 03:35)  T(F): 99.68 (28 Apr 2020 03:35), Max: 99.68 (28 Apr 2020 03:35)  HR: 69 (28 Apr 2020 01:05) (66 - 69)  BP: 111/56 (28 Apr 2020 01:05) (111/56 - 136/84)  RR: 14 (28 Apr 2020 01:01) (14 - 16)    SVE: 2/100/-1, forebag  FHT: baseline 125, moderate variability, + accels, - decels  [place]: ctx q2-3 min    - FHT cat 1  - Continue pitocin management  - Patient will advise team when she makes a decision regarding pain management  - Will continue to monitor

## 2020-04-29 LAB
ANISOCYTOSIS BLD QL: SLIGHT — SIGNIFICANT CHANGE UP
BASOPHILS # BLD AUTO: 0.14 K/UL — SIGNIFICANT CHANGE UP (ref 0–0.2)
BASOPHILS NFR BLD AUTO: 1.7 % — SIGNIFICANT CHANGE UP (ref 0–2)
ELLIPTOCYTES BLD QL SMEAR: SLIGHT — SIGNIFICANT CHANGE UP
EOSINOPHIL # BLD AUTO: 0 K/UL — SIGNIFICANT CHANGE UP (ref 0–0.5)
EOSINOPHIL NFR BLD AUTO: 0 % — SIGNIFICANT CHANGE UP (ref 0–6)
HCT VFR BLD CALC: 20.8 % — CRITICAL LOW (ref 34.5–45)
HCT VFR BLD CALC: 23.3 % — LOW (ref 34.5–45)
HGB BLD-MCNC: 7 G/DL — CRITICAL LOW (ref 11.5–15.5)
HGB BLD-MCNC: 7.6 G/DL — LOW (ref 11.5–15.5)
HYPOCHROMIA BLD QL: SLIGHT — SIGNIFICANT CHANGE UP
LYMPHOCYTES # BLD AUTO: 1.66 K/UL — SIGNIFICANT CHANGE UP (ref 1–3.3)
LYMPHOCYTES # BLD AUTO: 20 % — SIGNIFICANT CHANGE UP (ref 13–44)
MACROCYTES BLD QL: SLIGHT — SIGNIFICANT CHANGE UP
MANUAL SMEAR VERIFICATION: SIGNIFICANT CHANGE UP
MCHC RBC-ENTMCNC: 31.5 PG — SIGNIFICANT CHANGE UP (ref 27–34)
MCHC RBC-ENTMCNC: 32.3 PG — SIGNIFICANT CHANGE UP (ref 27–34)
MCHC RBC-ENTMCNC: 32.6 GM/DL — SIGNIFICANT CHANGE UP (ref 32–36)
MCHC RBC-ENTMCNC: 33.7 GM/DL — SIGNIFICANT CHANGE UP (ref 32–36)
MCV RBC AUTO: 95.9 FL — SIGNIFICANT CHANGE UP (ref 80–100)
MCV RBC AUTO: 96.7 FL — SIGNIFICANT CHANGE UP (ref 80–100)
METAMYELOCYTES # FLD: 0.9 % — HIGH (ref 0–0)
MICROCYTES BLD QL: SLIGHT — SIGNIFICANT CHANGE UP
MONOCYTES # BLD AUTO: 0.07 K/UL — SIGNIFICANT CHANGE UP (ref 0–0.9)
MONOCYTES NFR BLD AUTO: 0.9 % — LOW (ref 2–14)
NEUTROPHILS # BLD AUTO: 6.27 K/UL — SIGNIFICANT CHANGE UP (ref 1.8–7.4)
NEUTROPHILS NFR BLD AUTO: 67.8 % — SIGNIFICANT CHANGE UP (ref 43–77)
NEUTS BAND # BLD: 7.8 % — SIGNIFICANT CHANGE UP (ref 0–8)
PLAT MORPH BLD: NORMAL — SIGNIFICANT CHANGE UP
PLATELET # BLD AUTO: 114 K/UL — LOW (ref 150–400)
PLATELET # BLD AUTO: 135 K/UL — LOW (ref 150–400)
PLATELET COUNT - ESTIMATE: ABNORMAL
POIKILOCYTOSIS BLD QL AUTO: SLIGHT — SIGNIFICANT CHANGE UP
POLYCHROMASIA BLD QL SMEAR: SLIGHT — SIGNIFICANT CHANGE UP
RBC # BLD: 2.17 M/UL — LOW (ref 3.8–5.2)
RBC # BLD: 2.41 M/UL — LOW (ref 3.8–5.2)
RBC # FLD: 15.1 % — HIGH (ref 10.3–14.5)
RBC # FLD: 15.4 % — HIGH (ref 10.3–14.5)
RBC BLD AUTO: NORMAL — SIGNIFICANT CHANGE UP
VARIANT LYMPHS # BLD: 0.9 % — SIGNIFICANT CHANGE UP (ref 0–6)
WBC # BLD: 10.66 K/UL — HIGH (ref 3.8–10.5)
WBC # BLD: 8.3 K/UL — SIGNIFICANT CHANGE UP (ref 3.8–10.5)
WBC # FLD AUTO: 10.66 K/UL — HIGH (ref 3.8–10.5)
WBC # FLD AUTO: 8.3 K/UL — SIGNIFICANT CHANGE UP (ref 3.8–10.5)

## 2020-04-29 RX ORDER — FERROUS SULFATE 325(65) MG
325 TABLET ORAL THREE TIMES A DAY
Refills: 0 | Status: DISCONTINUED | OUTPATIENT
Start: 2020-04-29 | End: 2020-04-30

## 2020-04-29 RX ORDER — ASCORBIC ACID 60 MG
500 TABLET,CHEWABLE ORAL THREE TIMES A DAY
Refills: 0 | Status: DISCONTINUED | OUTPATIENT
Start: 2020-04-29 | End: 2020-04-30

## 2020-04-29 RX ORDER — ACETAMINOPHEN 500 MG
2 TABLET ORAL
Qty: 56 | Refills: 0
Start: 2020-04-29 | End: 2020-05-05

## 2020-04-29 RX ORDER — IRON SUCROSE 20 MG/ML
200 INJECTION, SOLUTION INTRAVENOUS ONCE
Refills: 0 | Status: COMPLETED | OUTPATIENT
Start: 2020-04-29 | End: 2020-04-29

## 2020-04-29 RX ORDER — IBUPROFEN 200 MG
1 TABLET ORAL
Qty: 28 | Refills: 0
Start: 2020-04-29 | End: 2020-05-05

## 2020-04-29 RX ORDER — IBUPROFEN 200 MG
600 TABLET ORAL EVERY 6 HOURS
Refills: 0 | Status: DISCONTINUED | OUTPATIENT
Start: 2020-04-29 | End: 2020-04-30

## 2020-04-29 RX ADMIN — Medication 975 MILLIGRAM(S): at 21:16

## 2020-04-29 RX ADMIN — ENOXAPARIN SODIUM 40 MILLIGRAM(S): 100 INJECTION SUBCUTANEOUS at 23:27

## 2020-04-29 RX ADMIN — Medication 600 MILLIGRAM(S): at 23:27

## 2020-04-29 RX ADMIN — Medication 30 MILLIGRAM(S): at 06:00

## 2020-04-29 RX ADMIN — IRON SUCROSE 110 MILLIGRAM(S): 20 INJECTION, SOLUTION INTRAVENOUS at 21:18

## 2020-04-29 RX ADMIN — Medication 325 MILLIGRAM(S): at 21:18

## 2020-04-29 RX ADMIN — Medication 500 MILLIGRAM(S): at 21:20

## 2020-04-29 RX ADMIN — Medication 975 MILLIGRAM(S): at 09:00

## 2020-04-29 RX ADMIN — Medication 600 MILLIGRAM(S): at 12:30

## 2020-04-29 RX ADMIN — Medication 600 MILLIGRAM(S): at 18:06

## 2020-04-29 NOTE — PROGRESS NOTE ADULT - ASSESSMENT
SONIA DAVILA is a 18 yo  now POD#1 s/p primary  section @ 40wk2d due to persistent Cat II tracing, face presentation, and suspected chorioamnionitis. Overnight, patient had diminished urine output at which time she was given a 500cc bolus of LR. Patient now presenting with appropriate UO post IVFH.

## 2020-04-29 NOTE — PROGRESS NOTE ADULT - PROBLEM SELECTOR PLAN 1
Patient feels well  Hgb:  11.3  --> F/U AM labs  Patient remained afebrile overnight (Tmax-37.9 on 4/28 @ 12:19)  Continue the current pain medication  Encourage  Ambulation  Encourage regular diet  DVT ppx: SCDs only when not ambulating + Lovenox QD  She is stable, tolerates a diet   Healthy male infant; patient states she will consider circumcision this AM.  Continue routine postpartum/postoperative care

## 2020-04-29 NOTE — CHART NOTE - NSCHARTNOTEFT_GEN_A_CORE
SONIA DAVILA is a 18 yo  now POD#1 s/p primary  section @ 40wk2d due to persistent Cat II tracing, face presentation, and suspected chorioamnionitis.     S:  Patient seen and examined at bedside this PM.  Patient states that she is overall feeling well. She reports that this AM she was feeling somewhat dizzy when using the restroom this morning; however, over the course of the day, the symptoms have resolved.  Patient states that she is tolerating PO diet.  She denies any nausea, vomiting, headaches, malaise or visual changes.      ICU Vital Signs Last 24 Hrs  T(C): 36.6 (2020 16:13), Max: 36.8 (2020 20:12)  T(F): 97.9 (2020 16:13), Max: 98.3 (2020 20:12)  HR: 111 (2020 16:13) (89 - 112)  BP: 101/67 (2020 16:13) (89/60 - 101/67)  RR: 18 (2020 16:13) (18 - 18)  SpO2: 97% (2020 16:13) (97% - 99%)      Physical Exam:  GEN: AOx3, NAD  Abdomen:  Soft, appropriately-tender, non-distended, +bowel sounds.  Uterus:  Fundus firm below umbilicus  Incision: Steri strips in place. Clean/dry/intact  VE:  +lochia  Ext:  Non-tender, no edema       SONIA DAVILA is a 18 yo  now POD#1 s/p primary  section @ 40wk2d due to persistent Cat II tracing, face presentation, and suspected chorioamnionitis; now presenting with acute blood loss anemia. Patient asymptomatic at bedside.     Plan:  - VS @ bedside while laying: BP-102/69, P-108. VS while standing 108/72, P-120  - Abdomen softly distended, but no rebound tenderness appreciated.  - Patient counseled in regards to receiving blood products in regards to low H&H. Patient declines blood transfusion and states that she is amenable to IV iron/PO iron  - Will repeat CBC in the AM   - Will continue to monitor     Plan d/w Dr. Horne & Dr. Hernandez

## 2020-04-29 NOTE — PROGRESS NOTE ADULT - SUBJECTIVE AND OBJECTIVE BOX
SONIA DAVILA is a 18 yo  now POD#1 s/p primary  section @ 40wk2d due to persistent Cat II tracing, face presentation, and suspected chorioamnionitis.     S:    Patient seen and examined at bedside this AM.  She is doing well, has no complaints. Pain controlled with medication   She is ambulating without difficulty and tolerating PO.   + flatus/-BM   She has yet to void after removal of the rm catheter this AM. Denies any current urge to void  She is supplementing with formula at this time.   No additional complaints at this time.    O:    T(C): 36.8 (20 @ 00:37), Max: 37.9 (20 @ 12:17)  HR: 91 (20 @ 00:37) (64 - 102)  BP: 101/65 (20 @ 00:37) (97/60 - 151/81)  RR: 18 (20 @ 00:37) (16 - 26)  SpO2: 99% (20 @ 00:37) (96% - 100%)    Breast: Nontender, not engorged   Abdomen:  Soft, appropriately-tender, non-distended, +bowel sounds.  Uterus:  Fundus firm below umbilicus  Incision: Steri strips in place. Clean/dry/intact  VE:  +lochia  Ext:  Non-tender, no edema                           11.3   7.97  )-----------( 141      ( 2020 22:45 )             33.2     Urine Output:  23:00-00:00: 40cc  00:00-01:00: 75cc  01:00-02:00: 100cc

## 2020-04-30 VITALS
RESPIRATION RATE: 18 BRPM | HEART RATE: 102 BPM | DIASTOLIC BLOOD PRESSURE: 70 MMHG | SYSTOLIC BLOOD PRESSURE: 129 MMHG | TEMPERATURE: 98 F | OXYGEN SATURATION: 98 %

## 2020-04-30 DIAGNOSIS — D62 ACUTE POSTHEMORRHAGIC ANEMIA: ICD-10-CM

## 2020-04-30 LAB
ANISOCYTOSIS BLD QL: SLIGHT — SIGNIFICANT CHANGE UP
BASOPHILS # BLD AUTO: 0 K/UL — SIGNIFICANT CHANGE UP (ref 0–0.2)
BASOPHILS # BLD AUTO: 0.05 K/UL — SIGNIFICANT CHANGE UP (ref 0–0.2)
BASOPHILS NFR BLD AUTO: 0 % — SIGNIFICANT CHANGE UP (ref 0–2)
BASOPHILS NFR BLD AUTO: 0.4 % — SIGNIFICANT CHANGE UP (ref 0–2)
EOSINOPHIL # BLD AUTO: 0 K/UL — SIGNIFICANT CHANGE UP (ref 0–0.5)
EOSINOPHIL # BLD AUTO: 0.08 K/UL — SIGNIFICANT CHANGE UP (ref 0–0.5)
EOSINOPHIL NFR BLD AUTO: 0 % — SIGNIFICANT CHANGE UP (ref 0–6)
EOSINOPHIL NFR BLD AUTO: 0.6 % — SIGNIFICANT CHANGE UP (ref 0–6)
HCT VFR BLD CALC: 19.7 % — CRITICAL LOW (ref 34.5–45)
HCT VFR BLD CALC: 28.1 % — LOW (ref 34.5–45)
HGB BLD-MCNC: 6.5 G/DL — CRITICAL LOW (ref 11.5–15.5)
HGB BLD-MCNC: 9.5 G/DL — LOW (ref 11.5–15.5)
HYPOCHROMIA BLD QL: SLIGHT — SIGNIFICANT CHANGE UP
IMM GRANULOCYTES NFR BLD AUTO: 5.8 % — HIGH (ref 0–1.5)
LYMPHOCYTES # BLD AUTO: 18.3 % — SIGNIFICANT CHANGE UP (ref 13–44)
LYMPHOCYTES # BLD AUTO: 19.1 % — SIGNIFICANT CHANGE UP (ref 13–44)
LYMPHOCYTES # BLD AUTO: 2.11 K/UL — SIGNIFICANT CHANGE UP (ref 1–3.3)
LYMPHOCYTES # BLD AUTO: 2.43 K/UL — SIGNIFICANT CHANGE UP (ref 1–3.3)
MANUAL SMEAR VERIFICATION: SIGNIFICANT CHANGE UP
MCHC RBC-ENTMCNC: 31.3 PG — SIGNIFICANT CHANGE UP (ref 27–34)
MCHC RBC-ENTMCNC: 32.2 PG — SIGNIFICANT CHANGE UP (ref 27–34)
MCHC RBC-ENTMCNC: 33 GM/DL — SIGNIFICANT CHANGE UP (ref 32–36)
MCHC RBC-ENTMCNC: 33.8 GM/DL — SIGNIFICANT CHANGE UP (ref 32–36)
MCV RBC AUTO: 92.4 FL — SIGNIFICANT CHANGE UP (ref 80–100)
MCV RBC AUTO: 97.5 FL — SIGNIFICANT CHANGE UP (ref 80–100)
METAMYELOCYTES # FLD: 1.7 % — HIGH (ref 0–0)
MONOCYTES # BLD AUTO: 0.6 K/UL — SIGNIFICANT CHANGE UP (ref 0–0.9)
MONOCYTES # BLD AUTO: 0.79 K/UL — SIGNIFICANT CHANGE UP (ref 0–0.9)
MONOCYTES NFR BLD AUTO: 5.2 % — SIGNIFICANT CHANGE UP (ref 2–14)
MONOCYTES NFR BLD AUTO: 6.2 % — SIGNIFICANT CHANGE UP (ref 2–14)
NEUTROPHILS # BLD AUTO: 8.44 K/UL — HIGH (ref 1.8–7.4)
NEUTROPHILS # BLD AUTO: 8.66 K/UL — HIGH (ref 1.8–7.4)
NEUTROPHILS NFR BLD AUTO: 67 % — SIGNIFICANT CHANGE UP (ref 43–77)
NEUTROPHILS NFR BLD AUTO: 67.9 % — SIGNIFICANT CHANGE UP (ref 43–77)
NEUTS BAND # BLD: 6.1 % — SIGNIFICANT CHANGE UP (ref 0–8)
OVALOCYTES BLD QL SMEAR: SLIGHT — SIGNIFICANT CHANGE UP
PLAT MORPH BLD: NORMAL — SIGNIFICANT CHANGE UP
PLATELET # BLD AUTO: 157 K/UL — SIGNIFICANT CHANGE UP (ref 150–400)
PLATELET # BLD AUTO: 212 K/UL — SIGNIFICANT CHANGE UP (ref 150–400)
POIKILOCYTOSIS BLD QL AUTO: SLIGHT — SIGNIFICANT CHANGE UP
POLYCHROMASIA BLD QL SMEAR: SLIGHT — SIGNIFICANT CHANGE UP
PROMYELOCYTES # FLD: 1.7 % — HIGH (ref 0–0)
RBC # BLD: 2.02 M/UL — LOW (ref 3.8–5.2)
RBC # BLD: 3.04 M/UL — LOW (ref 3.8–5.2)
RBC # FLD: 15.7 % — HIGH (ref 10.3–14.5)
RBC # FLD: 19.5 % — HIGH (ref 10.3–14.5)
RBC BLD AUTO: ABNORMAL
SCHISTOCYTES BLD QL AUTO: SLIGHT — SIGNIFICANT CHANGE UP
WBC # BLD: 11.54 K/UL — HIGH (ref 3.8–10.5)
WBC # BLD: 12.75 K/UL — HIGH (ref 3.8–10.5)
WBC # FLD AUTO: 11.54 K/UL — HIGH (ref 3.8–10.5)
WBC # FLD AUTO: 12.75 K/UL — HIGH (ref 3.8–10.5)

## 2020-04-30 PROCEDURE — 86901 BLOOD TYPING SEROLOGIC RH(D): CPT

## 2020-04-30 PROCEDURE — 85027 COMPLETE CBC AUTOMATED: CPT

## 2020-04-30 PROCEDURE — 86923 COMPATIBILITY TEST ELECTRIC: CPT

## 2020-04-30 PROCEDURE — G0463: CPT

## 2020-04-30 PROCEDURE — P9016: CPT

## 2020-04-30 PROCEDURE — 87635 SARS-COV-2 COVID-19 AMP PRB: CPT

## 2020-04-30 PROCEDURE — 59025 FETAL NON-STRESS TEST: CPT

## 2020-04-30 PROCEDURE — 36415 COLL VENOUS BLD VENIPUNCTURE: CPT

## 2020-04-30 PROCEDURE — 36430 TRANSFUSION BLD/BLD COMPNT: CPT

## 2020-04-30 PROCEDURE — 86900 BLOOD TYPING SEROLOGIC ABO: CPT

## 2020-04-30 PROCEDURE — 86850 RBC ANTIBODY SCREEN: CPT

## 2020-04-30 PROCEDURE — 86780 TREPONEMA PALLIDUM: CPT

## 2020-04-30 PROCEDURE — 59050 FETAL MONITOR W/REPORT: CPT

## 2020-04-30 RX ADMIN — Medication 500 MILLIGRAM(S): at 12:12

## 2020-04-30 RX ADMIN — Medication 975 MILLIGRAM(S): at 02:03

## 2020-04-30 RX ADMIN — Medication 500 MILLIGRAM(S): at 05:06

## 2020-04-30 RX ADMIN — Medication 600 MILLIGRAM(S): at 11:28

## 2020-04-30 RX ADMIN — Medication 975 MILLIGRAM(S): at 14:55

## 2020-04-30 RX ADMIN — Medication 600 MILLIGRAM(S): at 05:05

## 2020-04-30 RX ADMIN — Medication 975 MILLIGRAM(S): at 08:38

## 2020-04-30 RX ADMIN — Medication 325 MILLIGRAM(S): at 05:06

## 2020-04-30 RX ADMIN — Medication 600 MILLIGRAM(S): at 17:32

## 2020-04-30 RX ADMIN — Medication 325 MILLIGRAM(S): at 12:12

## 2020-04-30 NOTE — PROGRESS NOTE ADULT - ASSESSMENT
SONIA DAVILA is a 18 yo  now POD#2 s/p primary  section @ 40wk2d due to persistent Cat II tracing, face presentation, and suspected chorioamnionitis. Postpartum course complicated by acute blood loss anemia.

## 2020-04-30 NOTE — CHART NOTE - NSCHARTNOTEFT_GEN_A_CORE
18 y/o  now POD#2 s/p pCS at 40w2d for NRFHT. Post partum course significant for acute blood loss anemia, asymptomatic, declined transfusion yesterday.   Patient examined at bedside. Reports weakness and dizziness with ambulation.   Denies SOB, palpitations, or fatigue at rest or while ambulating.   Minimal lochia.   Voiding spontaneously.     Vital Signs Last 24 Hrs  T(C): 36.7 (2020 00:35), Max: 36.8 (2020 20:40)  T(F): 98 (2020 00:35), Max: 98.2 (2020 20:40)  HR: 109 (2020 06:54) (89 - 113)  BP: 111/80 (2020 06:54) (89/60 - 114/76)  RR: 18 (2020 06:54) (18 - 18)  SpO2: 98% (2020 06:54) (97% - 99%)    Gen: NAD   Abdomen: soft, appropriately tender to palpation, moderately distended, firm fundus at umbilicus                           6.5    11.54 )-----------( 157      ( 2020 05:56 )             19.7     A/P: Patient informed about worsening H/H. Informed of risks associated with acute anemia in post partum setting and recommended blood transfusion.   Risks and benefits of blood transfusion reviewed, consent obtained.   Currently with mild symptoms of anemia, vital signs WNL including orthostatic blood pressure readings, lochia WNL, +voiding.   Patient will be transfused 2u of pRBC's and will repeat blood work after.     d/w Dr. Hernandez

## 2020-04-30 NOTE — PROGRESS NOTE ADULT - SUBJECTIVE AND OBJECTIVE BOX
SONIA DAVILA is a 20 yo  now POD#2 s/p primary  section @ 40wk2d due to persistent Cat II tracing, face presentation, and suspected chorioamnionitis. Postpartum course complicated by acute blood loss anemia.    S:    Patient seen and examined at bedside this AM.  Patient denies any dizziness, headaches, or generalized weakness.  She is ambulating without difficulty and tolerating PO.   + flatus/-BM/Urinating spontaneously  She is supplementing with formula at this time.   No additional complaints at this time.    O:    T(C): 36.7 (20 @ 00:35), Max: 36.8 (20 @ 20:40)  HR: 107 (20 @ 00:35) (89 - 113)  BP: 114/76 (-20 @ 00:35) (89/60 - 114/76)  RR: 18 (-20 @ 00:35) (18 - 18)  SpO2: 98% (20 @ 00:35) (97% - 99%)    GEN: NAD  Abdomen:  Soft, appropriately-tender, non-distended, +bowel sounds.  Uterus:  Fundus firm below umbilicus  Incision: Steri strips in place. Clean/dry/intact  VE:  +lochia  Ext:  Non-tender, no edema                           7.0    10.66 )-----------( 135      ( 2020 16:02 )             20.8

## 2020-04-30 NOTE — PROGRESS NOTE ADULT - PROBLEM SELECTOR PLAN 2
- Hgb/HCT: 11.3/33.2 --> 7.6/23.3 --> 7.0/20.8 (4/30)  - Patient denies any weakness, dizziness, CP or palpitations.   - Patient declined blood transfusion yesterday and elected for conservative management with PO iron & IV iron sucrose  - Will  patient this AM in regards to benefits/risk of receiving blood products in light of downtrending Hgb.  - Will continue to monitor - Hgb/HCT: 11.3/33.2 --> 7.6/23.3 --> 7.0/20.8 (4/30)  - VSS @ bedside   - Patient denies any weakness, dizziness, CP or palpitations.   - Patient declined blood transfusion yesterday and elected for conservative management with PO iron & IV iron sucrose  - Patient this AM in regards to benefits/risk of refusing blood products in light of downtrending Hgb. Patient now amenable to receiving blood transfusion. Consent obtained at bedside by Dr. Mcwilliams  - 2u pRBCs ordered  - Will follow up with post-transfusion CBC  - Will continue to monitor

## 2020-04-30 NOTE — PROGRESS NOTE ADULT - PROBLEM SELECTOR PLAN 1
Patient remained afebrile overnight (Tmax-37.9 on 4/28 @ 12:19)  Continue the current pain medication  Encourage  Ambulation  Encourage regular diet  DVT ppx: SCDs only when not ambulating + Lovenox QD  She is stable, tolerates a diet   Healthy male infant; supplementing feeds with formula  Continue routine postpartum/postoperative care.

## 2020-04-30 NOTE — PROGRESS NOTE ADULT - ATTENDING COMMENTS
As above, pt is recovering well.  she is OOB now, ambulating, and eager for D/C home.  Discussed possible D/C tomorrow due to pending blood cultures of her .
As above, pt denies complaints.  She is aware of very low H/H and her VS are also C/W severe anemia.  She has finally agreed to trnsfusion and plan for transfusion of 2U PRBCs and D/C home after that time.

## 2020-10-26 NOTE — OB RN TRIAGE NOTE - NS_PARA_OBGYN_ALL_OB_NU
[Calm] : calm [de-identified] : He  is alert, well-groomed, and cheerful.\par   [de-identified] : Surgical wounds are  healing well.   no signs of  inflammation or infection.  0

## 2021-04-13 NOTE — ED STATDOCS - NS_ ATTENDINGSCRIBEDETAILS _ED_A_ED_FT
Imp:  GI bleed, but H/H up slightly today    REc:  Options again reviewed with son  We agree to observation, protonix, ASA  Defer EGD which was done at [facility] 2/2020 and showed gastritis I, Tania Moss, performed the initial face to face bedside interview with this patient regarding history of present illness, review of symptoms and relevant past medical, social and family history.  I completed an independent physical examination.  I was the provider who initially evaluated this patient.  The history, relevant review of systems, past medical and surgical history, medical decision making, and physical examination was documented by the scribe in my presence and I attest to the accuracy of the documentation. Follow-up on ordered tests (ie labs, radiologic studies) and re-evaluation of the patient's status has been communicated to the ACP.  Disposition of the patient will be based on test outcome and response to ED interventions.

## 2021-08-13 NOTE — OB RN DELIVERY SUMMARY - NS_CORDBLDTYPEA_OBGYN_ALL_OB
Next appt 8/24/21  Last appt 6/21/21    Refill request for  Disp Refills Start End     gabapentin (NEURONTIN) 300 MG capsule 90 capsule 3 5/11/2021     Sig - Route: Take 1 capsule by mouth 3 times daily. - Oral      Refill unable to be completed per standing protocol due to; NON PROTOCOL  Orders pended, and routed to provider for approval.  Please route any notes back to your nursing pool via patient call, NOT Rx Auth.    Thank you, Refill Center Staff     No

## 2021-08-17 NOTE — DISCHARGE NOTE OB - CALL YOUR HEALTHCARE PROVIDER IF YOU ARE EXPERIENCING SYMPTOMS OF DEPRESSION THAT LAST MORE THAN THREE DAYS
Statement Selected Humira Counseling:  I discussed with the patient the risks of adalimumab including but not limited to myelosuppression, immunosuppression, autoimmune hepatitis, demyelinating diseases, lymphoma, and serious infections.  The patient understands that monitoring is required including a PPD at baseline and must alert us or the primary physician if symptoms of infection or other concerning signs are noted.

## 2022-04-04 NOTE — OB RN DELIVERY SUMMARY - AS DELIV COMPLICATIONS OB
meconium stained fluid abnormal fetal heart rate tracing/malpresentation/meconium stained fluid room air

## 2022-07-14 NOTE — ED ADULT TRIAGE NOTE - BSA (M2)
Taltz Counseling: I discussed with the patient the risks of ixekizumab including but not limited to immunosuppression, serious infections, worsening of inflammatory bowel disease and drug reactions.  The patient understands that monitoring is required including a PPD at baseline and must alert us or the primary physician if symptoms of infection or other concerning signs are noted. 1.65

## 2022-09-28 NOTE — H&P ADULT - NEGATIVE MUSCULOSKELETAL SYMPTOMS
Improve strength and general endurance to good and be able to perform functional task- bed mobility, transfers and ambulation at a safe level and prevent falls.
no arthralgia/no arthritis/no joint swelling/no myalgia

## 2023-01-10 NOTE — OB RN TRIAGE NOTE - NS_TRIAGETIMEOF NOTIFICATION_OBGYN_ALL_OB_DT
Thank you! Thank you for allowing me to care for you in the emergency department. It is my goal to provide you with excellent care. If you have not received excellent quality care, please ask to speak to the nurse manager. Please fill out the survey that will come to you by mail or email since we listen to your feedback! Below you will find a list of your tests from today's visit. Should you have any questions, please do not hesitate to call the emergency department. Labs  No results found for this or any previous visit (from the past 12 hour(s)). Radiologic Studies  No orders to display     CT Results  (Last 48 hours)      None          CXR Results  (Last 48 hours)      None          ------------------------------------------------------------------------------------------------------------  The exam and treatment you received in the Emergency Department were for an urgent problem and are not intended as complete care. It is important that you follow-up with a doctor, nurse practitioner, or physician assistant to:  (1) confirm your diagnosis,  (2) re-evaluation of changes in your illness and treatment, and  (3) for ongoing care. Please take your discharge instructions with you when you go to your follow-up appointment. If you have any problem arranging a follow-up appointment, contact the Emergency Department. If your symptoms become worse or you do not improve as expected and you are unable to reach your health care provider, please return to the Emergency Department. We are available 24 hours a day. If a prescription has been provided, please have it filled as soon as possible to prevent a delay in treatment. If you have any questions or reservations about taking the medication due to side effects or interactions with other medications, please call your primary care provider or contact the ER. 26-Apr-2020 11:36

## 2023-04-11 NOTE — OB RN PREOPERATIVE CHECKLIST - PATIENT'S PERSONAL PROPERTY REMOVED
CHIEF COMPLAINT:  Eye Problem and Office Visit      HISTORY OF PRESENT ILLNESS: Patient is here for eye exam. Patient states her right eye is red. Pt states it was painful, sore and puffy. Pt states it started Monday out of the blue. Pt states 2 weeks ago right eye was red and went away on its own. No trauma.  Pt states she has pets.  Pt states similar thing has happened in the left eye in the past.  Pt states she has had this problem on and off for 5 years. PT states [facility] gave her cipro in October which helped. Pt is using refresh qid ou, zaditor prn. Pt uses lumify daily, has not used it this week.     POH: dry eyes, corneal abrasion,     Tech notes reviewed.    ASSESSMENT/PLAN  1. Scleritis od  - natural history of disease discussed with patient  - possible hx of recurrences in the past  - discussed autoimmune work up  - hx of spondylolistthesis  - recommend HLA b27 testing - pt wants preestimate  - recommend pred qid od x 1 week  - monitor    2. Dry Eyes  - natural history of disease discussed with patient  - recommend refresh 2-4x/day both eyes  - monitor    Patient's assessment and plan discussed in detail with patient.  All questions answered.         Thank you for choosing [facility]! We appreciate you trusting us with your medical care.     It is important to remember that some problems are difficult to diagnose and may not be found during your first visit. Be sure to follow up with your primary care doctor and review any labs/imaging that was performed during your visit with them. If you do not have a primary care doctor, you may contact the one listed on your discharge paperwork, or you may also call the [facility] Appointment Desk at [phone] to schedule an appointment.     All medications may potentially have side effects and it is impossible to predict which medications may give you side effects. If you feel that you are having a negative effect of any medication you should immediately stop taking them and seek medical attention. Do not drive or make any important decisions for 24 hours if you have received any pain medications, sedatives or mood altering drugs during your ER visit.    We will be happy to take care of you for all of your future medical needs. You may return to the ER at any time for any new/concerning symptoms, worsening condition, or failure to improve. We hope you feel better soon.     Zaid Stuart MD  Emergency Medicine Physician      jewelry

## 2023-10-19 NOTE — ED PROVIDER NOTE - CCCP TRG CHIEF CMPLNT
Detail Level: Simple
Quality 226: Preventive Care And Screening: Tobacco Use: Screening And Cessation Intervention: Patient screened for tobacco use and is an ex/non-smoker
fever/urinary symptoms

## 2025-01-27 NOTE — OB RN DELIVERY SUMMARY - BABY A: WEIGHT (GM) DELIVERY
Preventive Care Visit  [facility] AND \[facility] [place] [facility]\""  Kassy Santizo MD, Family Medicine  Jan 27, 2025    Assessment & Plan   12 year old 6 month old, here for preventive care.    Encounter for routine child health examination w/o abnormal findings  Normal interval growth and development.  Vaccines are up to date.  Declines flu, covid vaccines.  - BEHAVIORAL/EMOTIONAL ASSESSMENT (25416)  - SCREENING TEST, PURE TONE, AIR ONLY  Patient has been advised of split billing requirements and indicates understanding: Yes  Growth      Normal height and weight  Pediatric Healthy Lifestyle Action Plan         Exercise and nutrition counseling performed    Immunizations   Vaccines up to date.    Anticipatory Guidance    Reviewed age appropriate anticipatory guidance.   SOCIAL/ FAMILY:    Increased responsibility    Parent/ teen communication    Limits/consequences    Social media    TV/ media    School/ homework  NUTRITION:    Healthy food choices    Family meals    Calcium    Vitamins/supplements  HEALTH/ SAFETY:    Adequate sleep/ exercise    Sleep issues    Dental care    Drugs, ETOH, smoking    Body image  SEXUALITY:    Menstruation no period yet.    Cleared for sports:  Not addressed    Referrals/Ongoing Specialty Care  None  Verbal Dental Referral: Verbal dental referral was given        Return in 1 year (on 1/27/2026) for Preventive Care visit.    Subjective   Wing is presenting for the following:  Well Child            1/27/2025     9:58 AM   Additional Questions   Accompanied by mother   Questions for today's visit No   Surgery, major illness, or injury since last physical No           1/27/2025   Social   Lives with Parent(s)    Sibling(s)   Recent potential stressors None   History of trauma No   Family Hx of mental health challenges (!) YES   Lack of transportation has limited access to appts/meds No   Do you have housing? (Housing is defined as stable permanent housing and does not include staying ouside  "in a car, in a tent, in an abandoned building, in an overnight shelter, or couch-surfing.) No   Are you worried about losing your housing? No       Multiple values from one day are sorted in reverse-chronological order   (!) HOUSING CONCERN PRESENT      1/27/2025     9:52 AM   Health Risks/Safety   Does your adolescent always wear a seat belt? Yes   Helmet use? Yes   Do you have guns/firearms in the home? (!) YES   Are the guns/firearms secured in a safe or with a trigger lock? Yes   Is ammunition stored separately from guns? Yes         1/27/2025     9:52 AM   TB Screening   Was your adolescent born outside of the [place]? No         1/27/2025     9:52 AM   TB Screening: Consider immunosuppression as a risk factor for TB   Recent TB infection or positive TB test in family/close contacts No   Recent travel outside [place] (child/family/close contacts) (!) YES   Which country? [place]   For how long?  7   Recent residence in high-risk group setting (correctional facility/health care facility/homeless shelter/refugee camp) No         1/27/2025     9:52 AM   Dyslipidemia   FH: premature cardiovascular disease (!) UNKNOWN   FH: hyperlipidemia Unknown   Personal risk factors for heart disease NO diabetes, high blood pressure, obesity, smokes cigarettes, kidney problems, heart or kidney transplant, history of Kawasaki disease with an aneurysm, lupus, rheumatoid arthritis, or HIV     No results for input(s): \"CHOL\", \"HDL\", \"LDL\", \"TRIG\", \"CHOLHDLRATIO\" in the last 96735 hours.        1/27/2025     9:52 AM   Sudden Cardiac Arrest and Sudden Cardiac Death Screening   History of syncope/seizure No   History of exercise-related chest pain or shortness of breath (!) YES   FH: premature death (sudden/unexpected or other) attributable to heart diseases No   FH: cardiomyopathy, ion channelopothy, Marfan syndrome, or arrhythmia No         1/27/2025     9:52 AM   Dental Screening   Has your adolescent seen a dentist? Yes   When was " the last visit? Within the last 3 months   Has your adolescent had cavities in the last 3 years? No   Has your adolescent s parent(s), caregiver, or sibling(s) had any cavities in the last 2 years?  Unknown         1/27/2025   Diet   Do you have questions about your adolescent's eating?  No   Do you have questions about your adolescent's height or weight? No   What does your adolescent regularly drink? Water    (!) JUICE    (!) POP   How often does your family eat meals together? Most days   Servings of fruits/vegetables per day (!) 1-2   At least 3 servings of food or beverages that have calcium each day? Yes   In past 12 months, concerned food might run out No   In past 12 months, food has run out/couldn't afford more No       Multiple values from one day are sorted in reverse-chronological order           1/27/2025   Activity   Days per week of moderate/strenuous exercise 2 days   On average, how many minutes do you engage in exercise at this level? 30 min   What does your adolescent do for exercise?  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 No   Daytime sleepiness/naps No         1/27/2025     9:52 AM   School   School concerns No concerns   Grade in school 8th Grade   Current school [employer]   School absences (>2 days/mo) No         1/27/2025     9:52 AM   Vision/Hearing   Vision or hearing concerns No concerns         1/27/2025     9:52 AM   Development / Social-Emotional Screen   Developmental concerns No     Psycho-Social/Depression - PSC-17 required for C&TC through age 17  General screening:  Electronic PSC       1/27/2025     9:53 AM   PSC SCORES   Inattentive / Hyperactive Symptoms Subtotal 7 (At Risk)    Externalizing Symptoms  "Subtotal 1    Internalizing Symptoms Subtotal 3    PSC - 17 Total Score 11        Patient-reported       Follow up:  no follow up necessary          1/27/2025     9:52 AM   AMB [facility] MENSES SECTION   What are your adolescent's periods like?  (!) OTHER   Please specify: na          Objective     Exam  /80   Pulse 83   Temp 97.1  F (36.2  C) (Tympanic)   Resp 18   Ht 1.689 m (5' 6.5\")   Wt 92.6 kg (204 lb 3.2 oz)   SpO2 98%   Breastfeeding No   BMI 32.46 kg/m    93 %ile (Z= 1.46) based on CDC (Girls, 2-20 Years) Stature-for-age data based on Stature recorded on 1/27/2025.  >99 %ile (Z= 2.50) based on CDC (Girls, 2-20 Years) weight-for-age data using data from 1/27/2025.  98 %ile (Z= 2.15) based on [facility] (Girls, 2-20 Years) BMI-for-age based on BMI available on 1/27/2025.  Blood pressure %hadley are 64% systolic and 94% diastolic based on the 2017 AAP Clinical Practice Guideline. This reading is in the Stage 1 hypertension range (BP >= 130/80).    Vision Screen       Hearing Screen  RIGHT EAR  1000 Hz on Level 40 dB (Conditioning sound): Pass  1000 Hz on Level 20 dB: Pass  2000 Hz on Level 20 dB: Pass  4000 Hz on Level 20 dB: Pass  6000 Hz on Level 20 dB: Pass  8000 Hz on Level 20 dB: Pass  LEFT EAR  8000 Hz on Level 20 dB: Pass  6000 Hz on Level 20 dB: Pass  4000 Hz on Level 20 dB: Pass  2000 Hz on Level 20 dB: Pass  1000 Hz on Level 20 dB: Pass  500 Hz on Level 25 dB: Pass  RIGHT EAR  500 Hz on Level 25 dB: Pass  Results  Hearing Screen Results: Pass      Physical Exam  GENERAL: Active, alert, in no acute distress.  SKIN: Clear. No significant rash, abnormal pigmentation or lesions  HEAD: Normocephalic  EYES: Pupils equal, round, reactive, Extraocular muscles intact. Normal conjunctivae.  EARS: Normal canals. Tympanic membranes are normal; gray and translucent.  NOSE: Normal without discharge.  MOUTH/THROAT: Clear. No oral lesions. Teeth without obvious abnormalities.  NECK: Supple, no masses.  No " thyromegaly.  LYMPH NODES: No adenopathy  LUNGS: Clear. No rales, rhonchi, wheezing or retractions  HEART: Regular rhythm. Normal S1/S2. No murmurs. Normal pulses.  ABDOMEN: Soft, non-tender, not distended, no masses or hepatosplenomegaly. Bowel sounds normal.   NEUROLOGIC: No focal findings. Cranial nerves grossly intact: DTR's normal. Normal gait, strength and tone  BACK: Spine is straight, no scoliosis.  EXTREMITIES: Full range of motion, no deformities          Signed Electronically by: Kassy Santizo MD     [id number]